# Patient Record
Sex: FEMALE | Race: WHITE | NOT HISPANIC OR LATINO | Employment: OTHER | URBAN - METROPOLITAN AREA
[De-identification: names, ages, dates, MRNs, and addresses within clinical notes are randomized per-mention and may not be internally consistent; named-entity substitution may affect disease eponyms.]

---

## 2023-01-10 ENCOUNTER — OFFICE VISIT (OUTPATIENT)
Dept: FAMILY MEDICINE CLINIC | Facility: CLINIC | Age: 83
End: 2023-01-10

## 2023-01-10 VITALS
TEMPERATURE: 97.8 F | DIASTOLIC BLOOD PRESSURE: 86 MMHG | BODY MASS INDEX: 24.35 KG/M2 | HEART RATE: 83 BPM | WEIGHT: 129 LBS | SYSTOLIC BLOOD PRESSURE: 128 MMHG | OXYGEN SATURATION: 100 % | HEIGHT: 61 IN

## 2023-01-10 DIAGNOSIS — E78.5 HYPERLIPIDEMIA, UNSPECIFIED HYPERLIPIDEMIA TYPE: ICD-10-CM

## 2023-01-10 DIAGNOSIS — N18.31 STAGE 3A CHRONIC KIDNEY DISEASE (HCC): ICD-10-CM

## 2023-01-10 DIAGNOSIS — D61.818 PANCYTOPENIA (HCC): ICD-10-CM

## 2023-01-10 DIAGNOSIS — E03.9 ACQUIRED HYPOTHYROIDISM: ICD-10-CM

## 2023-01-10 DIAGNOSIS — C34.92 NON-SMALL CELL CARCINOMA OF LEFT LUNG (HCC): ICD-10-CM

## 2023-01-10 DIAGNOSIS — Z00.00 INITIAL MEDICARE ANNUAL WELLNESS VISIT: Primary | ICD-10-CM

## 2023-01-10 DIAGNOSIS — R09.81 CHRONIC NASAL CONGESTION: ICD-10-CM

## 2023-01-10 DIAGNOSIS — E61.1 IRON DEFICIENCY: ICD-10-CM

## 2023-01-10 DIAGNOSIS — I73.9 PERIPHERAL VASCULAR DISEASE (HCC): ICD-10-CM

## 2023-01-10 DIAGNOSIS — I48.0 PAROXYSMAL ATRIAL FIBRILLATION (HCC): ICD-10-CM

## 2023-01-10 DIAGNOSIS — J45.20 MILD INTERMITTENT ASTHMA, UNSPECIFIED WHETHER COMPLICATED: ICD-10-CM

## 2023-01-10 PROBLEM — J45.909 ASTHMA: Status: ACTIVE | Noted: 2017-05-28

## 2023-01-10 PROBLEM — G25.2 ACTION TREMOR: Status: ACTIVE | Noted: 2022-03-25

## 2023-01-10 RX ORDER — OLMESARTAN MEDOXOMIL 20 MG/1
1 TABLET ORAL AS NEEDED
COMMUNITY
Start: 2022-06-07 | End: 2023-07-17 | Stop reason: SDUPTHER

## 2023-01-10 RX ORDER — DOCUSATE SODIUM 100 MG/1
100 CAPSULE, LIQUID FILLED ORAL AS NEEDED
COMMUNITY

## 2023-01-10 RX ORDER — ASPIRIN 325 MG
325 TABLET, DELAYED RELEASE (ENTERIC COATED) ORAL
COMMUNITY

## 2023-01-10 RX ORDER — IPRATROPIUM BROMIDE 21 UG/1
2 SPRAY, METERED NASAL 2 TIMES DAILY
Qty: 30 ML | Refills: 5 | Status: SHIPPED | OUTPATIENT
Start: 2023-01-10

## 2023-01-10 RX ORDER — AMIODARONE HYDROCHLORIDE 100 MG/1
100 TABLET ORAL DAILY
COMMUNITY

## 2023-01-10 RX ORDER — LORATADINE 10 MG/1
10 CAPSULE, LIQUID FILLED ORAL
COMMUNITY

## 2023-01-10 RX ORDER — IPRATROPIUM BROMIDE 21 UG/1
2 SPRAY, METERED NASAL
COMMUNITY
Start: 2022-12-06 | End: 2023-01-10 | Stop reason: SDUPTHER

## 2023-01-10 RX ORDER — ROSUVASTATIN CALCIUM 10 MG/1
10 TABLET, COATED ORAL EVERY EVENING
COMMUNITY

## 2023-01-10 RX ORDER — IPRATROPIUM BROMIDE 21 UG/1
2 SPRAY, METERED NASAL
Status: CANCELLED | OUTPATIENT
Start: 2023-01-10

## 2023-01-10 RX ORDER — SIMETHICONE 125 MG
125 TABLET,CHEWABLE ORAL EVERY 6 HOURS PRN
COMMUNITY

## 2023-01-10 RX ORDER — FLUTICASONE PROPIONATE AND SALMETEROL 250; 50 UG/1; UG/1
1 POWDER RESPIRATORY (INHALATION)
COMMUNITY
End: 2023-01-10 | Stop reason: ALTCHOICE

## 2023-01-10 RX ORDER — LEVOTHYROXINE SODIUM 88 UG/1
TABLET ORAL
COMMUNITY
Start: 2022-12-19 | End: 2023-07-07 | Stop reason: SDUPTHER

## 2023-01-10 RX ORDER — ESOMEPRAZOLE MAGNESIUM 40 MG/1
40 CAPSULE, DELAYED RELEASE ORAL DAILY
COMMUNITY

## 2023-01-10 NOTE — PATIENT INSTRUCTIONS
Complete labs in 6 months  Follow up visit in July  Medicare Preventive Visit Patient Instructions  Thank you for completing your Welcome to Medicare Visit or Medicare Annual Wellness Visit today  Your next wellness visit will be due in one year (1/11/2024)  The screening/preventive services that you may require over the next 5-10 years are detailed below  Some tests may not apply to you based off risk factors and/or age  Screening tests ordered at today's visit but not completed yet may show as past due  Also, please note that scanned in results may not display below  Preventive Screenings:  Service Recommendations Previous Testing/Comments   Colorectal Cancer Screening  * Colonoscopy    * Fecal Occult Blood Test (FOBT)/Fecal Immunochemical Test (FIT)  * Fecal DNA/Cologuard Test  * Flexible Sigmoidoscopy Age: 39-70 years old   Colonoscopy: every 10 years (may be performed more frequently if at higher risk)  OR  FOBT/FIT: every 1 year  OR  Cologuard: every 3 years  OR  Sigmoidoscopy: every 5 years  Screening may be recommended earlier than age 39 if at higher risk for colorectal cancer  Also, an individualized decision between you and your healthcare provider will decide whether screening between the ages of 74-80 would be appropriate  Colonoscopy: Not on file  FOBT/FIT: Not on file  Cologuard: Not on file  Sigmoidoscopy: Not on file          Breast Cancer Screening Age: 36 years old  Frequency: every 1-2 years  Not required if history of left and right mastectomy Mammogram: Not on file        Cervical Cancer Screening Between the ages of 21-29, pap smear recommended once every 3 years  Between the ages of 33-67, can perform pap smear with HPV co-testing every 5 years     Recommendations may differ for women with a history of total hysterectomy, cervical cancer, or abnormal pap smears in past  Pap Smear: Not on file    Screening Not Indicated   Hepatitis C Screening Once for adults born between 1945 and 1965  More frequently in patients at high risk for Hepatitis C Hep C Antibody: Not on file        Diabetes Screening 1-2 times per year if you're at risk for diabetes or have pre-diabetes Fasting glucose: No results in last 5 years (No results in last 5 years)  A1C: No results in last 5 years (No results in last 5 years)      Cholesterol Screening Once every 5 years if you don't have a lipid disorder  May order more often based on risk factors  Lipid panel: Not on file          Other Preventive Screenings Covered by Medicare:  Abdominal Aortic Aneurysm (AAA) Screening: covered once if your at risk  You're considered to be at risk if you have a family history of AAA  Lung Cancer Screening: covers low dose CT scan once per year if you meet all of the following conditions: (1) Age 50-69; (2) No signs or symptoms of lung cancer; (3) Current smoker or have quit smoking within the last 15 years; (4) You have a tobacco smoking history of at least 20 pack years (packs per day multiplied by number of years you smoked); (5) You get a written order from a healthcare provider  Glaucoma Screening: covered annually if you're considered high risk: (1) You have diabetes OR (2) Family history of glaucoma OR (3)  aged 48 and older OR (3)  American aged 72 and older  Osteoporosis Screening: covered every 2 years if you meet one of the following conditions: (1) You're estrogen deficient and at risk for osteoporosis based off medical history and other findings; (2) Have a vertebral abnormality; (3) On glucocorticoid therapy for more than 3 months; (4) Have primary hyperparathyroidism; (5) On osteoporosis medications and need to assess response to drug therapy  Last bone density test (DXA Scan): Not on file  HIV Screening: covered annually if you're between the age of 12-76  Also covered annually if you are younger than 13 and older than 72 with risk factors for HIV infection   For pregnant patients, it is covered up to 3 times per pregnancy  Immunizations:  Immunization Recommendations   Influenza Vaccine Annual influenza vaccination during flu season is recommended for all persons aged >= 6 months who do not have contraindications   Pneumococcal Vaccine   * Pneumococcal conjugate vaccine = PCV13 (Prevnar 13), PCV15 (Vaxneuvance), PCV20 (Prevnar 20)  * Pneumococcal polysaccharide vaccine = PPSV23 (Pneumovax) Adults 25-60 years old: 1-3 doses may be recommended based on certain risk factors  Adults 72 years old: 1-2 doses may be recommended based off what pneumonia vaccine you previously received   Hepatitis B Vaccine 3 dose series if at intermediate or high risk (ex: diabetes, end stage renal disease, liver disease)   Tetanus (Td) Vaccine - COST NOT COVERED BY MEDICARE PART B Following completion of primary series, a booster dose should be given every 10 years to maintain immunity against tetanus  Td may also be given as tetanus wound prophylaxis  Tdap Vaccine - COST NOT COVERED BY MEDICARE PART B Recommended at least once for all adults  For pregnant patients, recommended with each pregnancy  Shingles Vaccine (Shingrix) - COST NOT COVERED BY MEDICARE PART B  2 shot series recommended in those aged 48 and above     Health Maintenance Due:  There are no preventive care reminders to display for this patient  Immunizations Due:      Topic Date Due    Hepatitis B Vaccine (1 of 3 - 3-dose series) Never done    COVID-19 Vaccine (5 - Booster for Moderna series) 08/02/2022     Advance Directives   What are advance directives? Advance directives are legal documents that state your wishes and plans for medical care  These plans are made ahead of time in case you lose your ability to make decisions for yourself  Advance directives can apply to any medical decision, such as the treatments you want, and if you want to donate organs  What are the types of advance directives?   There are many types of advance directives, and each state has rules about how to use them  You may choose a combination of any of the following:  Living will: This is a written record of the treatment you want  You can also choose which treatments you do not want, which to limit, and which to stop at a certain time  This includes surgery, medicine, IV fluid, and tube feedings  Durable power of  for healthcare [facility]): This is a written record that states who you want to make healthcare choices for you when you are unable to make them for yourself  This person, called a proxy, is usually a family member or a friend  You may choose more than 1 proxy  Do not resuscitate (DNR) order:  A DNR order is used in case your heart stops beating or you stop breathing  It is a request not to have certain forms of treatment, such as CPR  A DNR order may be included in other types of advance directives  Medical directive: This covers the care that you want if you are in a coma, near death, or unable to make decisions for yourself  You can list the treatments you want for each condition  Treatment may include pain medicine, surgery, blood transfusions, dialysis, IV or tube feedings, and a ventilator (breathing machine)  Values history: This document has questions about your views, beliefs, and how you feel and think about life  This information can help others choose the care that you would choose  Why are advance directives important? An advance directive helps you control your care  Although spoken wishes may be used, it is better to have your wishes written down  Spoken wishes can be misunderstood, or not followed  Treatments may be given even if you do not want them  An advance directive may make it easier for your family to make difficult choices about your care  © Copyright [employer] 2018 Information is for End User's use only and may not be sold, redistributed or otherwise used for commercial purposes   All illustrations and images included in [facility] are the copyrighted property of A D A M , Inc  or [facility] Marianne Chou

## 2023-01-10 NOTE — ASSESSMENT & PLAN NOTE
A fib is currently rate controlled with medications  Continue Amiodarone 100 mg daily  Continue Eliquis and aspirin daily  Patient is refusing referral to Cardiology at this time

## 2023-01-10 NOTE — ASSESSMENT & PLAN NOTE
Continue management per oncologist Dr Tracey Thomason in [place]  Patient is in remission and not undergoing active treatment at this time  She is scheduled for Brain MRI and CT PET scan later this month

## 2023-01-10 NOTE — PROGRESS NOTES
Assessment and Plan:     Problem List Items Addressed This Visit        Endocrine    Acquired hypothyroidism     Check TSH/T4 level every 6 months  Continue Levothyroxine 88 mcg daily  Relevant Orders    TSH, 3rd generation with Free T4 reflex       Respiratory    Asthma     Asthma is well controlled with Combivent inhaler  Non-small cell carcinoma of left lung [facility])     Continue management per oncologist Dr Janna Brewster in [place]  Patient is in remission and not undergoing active treatment at this time  She is scheduled for Brain MRI and CT PET scan later this month  Relevant Medications    ipratropium (ATROVENT) 0 03 % nasal spray       Cardiovascular and Mediastinum    Peripheral vascular disease (HCC)     Continue Eliquis and aspirin daily  Patient is refusing referral to cardiology at this time  Paroxysmal atrial fibrillation (HCC)     A fib is currently rate controlled with medications  Continue Amiodarone 100 mg daily  Continue Eliquis and aspirin daily  Patient is refusing referral to Cardiology at this time  Hematopoietic and Hemostatic    Pancytopenia (HCC)     Chronic         Relevant Medications    apixaban (ELIQUIS) 2 5 mg       Genitourinary    Stage 3a chronic kidney disease (HCC)     Chronic, monitor every 6 months          Relevant Orders    CBC and differential    Comprehensive metabolic panel       Other    Hyperlipidemia     Check fasting lipid panel and continue Crestor 10 mg daily  Relevant Medications    rosuvastatin (CRESTOR) 10 MG tablet    Other Relevant Orders    Lipid Panel with Direct LDL reflex    Iron deficiency     History of iron deficiency  Monitor regularly  Relevant Orders    Iron Panel (Includes Ferritin, Iron Sat%, Iron, and TIBC)    Chronic nasal congestion     Chronic nasal congestion controlled with nasal spray daily            Relevant Medications    ipratropium (ATROVENT) 0 03 % nasal spray Other Visit Diagnoses     Initial Medicare annual wellness visit    -  Primary           Preventive health issues were discussed with patient, and age appropriate screening tests were ordered as noted in patient's After Visit Summary  Personalized health advice and appropriate referrals for health education or preventive services given if needed, as noted in patient's After Visit Summary  Patient is here to establish care  She recently moved to the area from [place] and is living her son Marta Davalos and his wife Lilibeth Maguire  She has some gait imbalance and uses her walker for stablity and safety  Patient admits that she does not exercise regularly  She does not eat dinner  She only eats breakfast and lunch  She has no acute complaints today  Patient is going to follow with her oncologist Dr Amado Gonzalez in [place]  History of Present Illness:     Patient presents for a Medicare Wellness Visit    HPI   Patient Care Team:  Jacob Singh DO as PCP - General (Family Medicine)     Review of Systems:     Review of Systems   Constitutional: Negative for activity change, appetite change, chills, fatigue and fever  HENT: Negative for congestion, ear discharge, ear pain, postnasal drip, rhinorrhea, sinus pressure, sinus pain, sneezing, sore throat and trouble swallowing  Eyes: Negative for pain, discharge, redness, itching and visual disturbance  Respiratory: Negative for apnea, cough, chest tightness, shortness of breath and wheezing  Cardiovascular: Negative for chest pain, palpitations and leg swelling  Gastrointestinal: Negative for abdominal distention, abdominal pain, blood in stool, constipation, diarrhea, nausea and vomiting  Endocrine: Negative for polyuria  Genitourinary: Negative for decreased urine volume, difficulty urinating, dyspareunia, dysuria, flank pain, frequency, menstrual problem, pelvic pain, urgency, vaginal bleeding and vaginal discharge     Musculoskeletal: Negative for arthralgias, gait problem, joint swelling and myalgias  Skin: Negative for rash  Neurological: Negative for dizziness, weakness, light-headedness, numbness and headaches  Psychiatric/Behavioral: Negative for behavioral problems and dysphoric mood  The patient is not nervous/anxious  All other systems reviewed and are negative  Problem List:     Patient Active Problem List   Diagnosis   • Acquired hypothyroidism   • Action tremor   • Asthma   • Stage 3[facility]   • Hyperlipidemia   • Non-small cell carcinoma of left lung (HCC)   • Pancytopenia (HCC)   • Peripheral vascular disease (HCC)   • Paroxysmal atrial fibrillation (HCC)   • Iron deficiency   • Chronic nasal congestion      Past Medical and Surgical History:     Past Medical History:   Diagnosis Date   • [facility]ib [facility] )    • Chronic kidney disease    • Essential hypertension    • Hyperlipidemia    • Osteoporosis    • Thyroid disease      Past Surgical History:   Procedure Laterality Date   • CAROTID ENDARTARECTOMY Left    • HIP SURGERY Bilateral    • LUNG SURGERY Left       Family History:     Family History   Problem Relation Age of Onset   • Heart disease Mother    • Heart disease Father    • Kidney disease Father       Social History:     Social History     Socioeconomic History   • Marital status:       Spouse name: None   • Number of children: None   • Years of education: None   • Highest education level: None   Occupational History   • None   Tobacco Use   • Smoking status: Former     Types: Cigarettes   • Smokeless tobacco: Never   Vaping Use   • Vaping Use: Never used   Substance and Sexual Activity   • Alcohol use: Yes     Comment: rarely   • Drug use: None   • Sexual activity: None   Other Topics Concern   • None   Social History Narrative   • None     Social Determinants of Health     Financial Resource Strain: Not on file   Food Insecurity: Not on file   Transportation Needs: Not on file   Physical Activity: Not on file   Stress: Not on file   Social Connections: Not on file   Intimate Partner Violence: Not on file   Housing Stability: Not on file      Medications and Allergies:     Current Outpatient Medications   Medication Sig Dispense Refill   • amiodarone 100 mg tablet Take 100 mg by mouth daily     • apixaban (ELIQUIS) 2 5 mg Take by mouth 2 (two) times a day     • aspirin (ECOTRIN) 325 mg EC tablet Take 325 mg by mouth     • Calcium Carbonate (CALTRATE 600 PO) Take by mouth     • dextromethorphan-guaifenesin (MUCINEX DM)  MG per 12 hr tablet Take 1 tablet by mouth if needed     • docusate sodium (COLACE) 100 mg capsule Take 100 mg by mouth if needed for constipation     • esomeprazole (NexIUM) 40 MG capsule Take 40 mg by mouth in the morning     • ipratropium (ATROVENT) 0 03 % nasal spray 2 sprays into each nostril 2 (two) times a day 30 mL 5   • ipratropium-albuterol (COMBIVENT RESPIMAT) inhaler Inhale 1 puff every 6 (six) hours as needed     • levothyroxine 88 mcg tablet Take by mouth 1/2 tablet Saturday and one tab all other days  Take on empty stomach, at least 30 minutes before food        • Loratadine 10 MG CAPS Take 10 mg by mouth     • olmesartan (BENICAR) 20 mg tablet Take 1 tablet by mouth if needed     • rosuvastatin (CRESTOR) 10 MG tablet Take 10 mg by mouth every evening     • simethicone (MYLICON) 236 MG chewable tablet Chew 125 mg every 6 (six) hours as needed       No current facility-administered medications for this visit       Allergies   Allergen Reactions   • Other Eye Swelling     Seasonal Allergies        Immunizations:     Immunization History   Administered Date(s) Administered   • COVID-19 MODERNA VACC 0 5 ML IM 02/09/2021, 03/09/2021, 10/26/2021, 06/07/2022   • INFLUENZA 09/13/2021   • Influenza Split High Dose Preservative Free IM 09/14/2017, 10/08/2020, 09/11/2022   • Influenza, high dose seasonal 0 7 mL 10/04/2018, 10/18/2019   • Influenza, seasonal, injectable 09/08/2010, 10/01/2011, 09/27/2012, 09/14/2013, 09/18/2014, 09/30/2014, 10/05/2016   • Pneumococcal Conjugate 13-Valent 09/29/2009, 11/12/2014, 02/02/2015   • Pneumococcal Polysaccharide PPV23 09/09/2009, 11/12/2014   • Zoster 06/01/2015   • Zoster Vaccine Recombinant 09/10/2019, 12/10/2019      Health Maintenance: There are no preventive care reminders to display for this patient  Topic Date Due   • Hepatitis B Vaccine (1 of 3 - 3-dose series) Never done   • COVID-19 Vaccine (5 - Booster for Moderna series) 08/02/2022      Medicare Screening Tests and Risk Assessments:     Charity Pollard is here for her Initial Wellness visit  Health Risk Assessment:   Patient rates overall health as very good  Patient feels that their physical health rating is much better  Patient is very satisfied with their life  Eyesight was rated as slightly worse  Hearing was rated as same  Patient feels that their emotional and mental health rating is much better  Patients states they are never, rarely angry  Patient states they are never, rarely unusually tired/fatigued  Pain experienced in the last 7 days has been some  Patient's pain rating has been 1/10  Patient states that she has experienced no weight loss or gain in last 6 months  Fall Risk Screening: In the past year, patient has experienced: no history of falling in past year      Urinary Incontinence Screening:   Patient has not leaked urine accidently in the last six months  Home Safety:  Patient has trouble with stairs inside or outside of their home  Patient has working smoke alarms and has working carbon monoxide detector  Home safety hazards include: none  Nutrition:   Current diet is Regular  Medications:   Patient is currently taking over-the-counter supplements  OTC medications include: see medication list  Patient is able to manage medications  Advance Care Planning:   Living will: No    Durable POA for healthcare:  Yes    Advanced directive: Yes    Five wishes given: Yes      PREVENTIVE SCREENINGS        Cervical Cancer Screening:    General: Screening Not Indicated      Lung Cancer Screening:     General: Screening Not Indicated    Screening, Brief Intervention, and Referral to Treatment (SBIRT)    Screening  Typical number of drinks in a day: 1  Typical number of drinks in a week: 7  Interpretation: Low risk drinking behavior  Single Item Drug Screening:  How often have you used an illegal drug (including marijuana) or a prescription medication for non-medical reasons in the past year? never    Single Item Drug Screen Score: 0  Interpretation: Negative screen for possible drug use disorder    No results found  Physical Exam:     /86 (BP Location: Right arm, Patient Position: Sitting, Cuff Size: Standard)   Pulse 83   Temp 97 8 °F (36 6 °C) (Temporal)   Ht 5' 1" (1 549 m)   Wt 58 5 kg (129 lb)   SpO2 100%   BMI 24 37 kg/m²     Physical Exam  Vitals and nursing note reviewed  Constitutional:       General: She is not in acute distress  Appearance: Normal appearance  HENT:      Head: Normocephalic and atraumatic  Right Ear: Tympanic membrane, ear canal and external ear normal       Left Ear: Tympanic membrane, ear canal and external ear normal       Nose: Nose normal  No congestion  Mouth/Throat:      Mouth: Mucous membranes are moist       Pharynx: Oropharynx is clear  Eyes:      Extraocular Movements: Extraocular movements intact  Conjunctiva/sclera: Conjunctivae normal       Pupils: Pupils are equal, round, and reactive to light  Neck:      Vascular: No carotid bruit  Cardiovascular:      Rate and Rhythm: Normal rate and regular rhythm  Heart sounds: Murmur heard  Pulmonary:      Effort: Pulmonary effort is normal  No respiratory distress  Breath sounds: Normal breath sounds  Abdominal:      General: Bowel sounds are normal  There is no distension  Palpations: Abdomen is soft  There is no mass  Musculoskeletal:         General: No swelling  Normal range of motion  Cervical back: Normal range of motion  Lymphadenopathy:      Cervical: No cervical adenopathy  Skin:     General: Skin is warm and dry  Neurological:      General: No focal deficit present  Mental Status: She is alert and oriented to person, place, and time  Psychiatric:         Mood and Affect: Mood normal          Behavior: Behavior normal          Thought Content:  Thought content normal           Monique Brown DO

## 2023-01-11 ENCOUNTER — TELEPHONE (OUTPATIENT)
Dept: ADMINISTRATIVE | Facility: OTHER | Age: 83
End: 2023-01-11

## 2023-01-11 NOTE — TELEPHONE ENCOUNTER
Upon review of the In Basket request and the patient's chart, initial outreach has been made via fax to facility  Please see Contacts section for details       Thank you  Rick Martines MA

## 2023-01-11 NOTE — LETTER
Procedure Request Form: DEXA Scan      Date Requested: 23  Patient: Saul Newberry  Patient : [id number]   Referring Provider: Lukas Golden DO        Date of Procedure ______________________________       The above patient has informed us that they have completed their   most recent DEXA Scan at your facility  Please complete   this form and attach all corresponding procedure reports/results  Comments ___2021 Dexa result - This result has an attachment that is not available  Would you release this to us?____________________________________  ____________________________________________________________________  ____________________________________________________________________  ____________________________________________________________________    Facility Completing Procedure _________________________________________    Form Completed By (print name) _______________________________________      Signature __________________________________________________________      These reports are needed for  compliance  Please fax this completed form and a copy of the procedure report to our office located at [place] [address] as soon as possible to [phone] pari Youssef: Phone [phone]    We thank you for your assistance in treating our mutual patient

## 2023-01-11 NOTE — TELEPHONE ENCOUNTER
----- Message from Berhane Wooten, [address] [place] sent at 1/10/2023 12:34 PM EST -----  Regarding: Care Gap Request  01/10/23 12:34 PM    Hello, our patient attached above has had DEXA Scan completed/performed  Please assist in updating the patient chart by pulling the Care Everywhere (CE) document  The date of service is 2021       Thank you,  Randa Mason PG [facility]

## 2023-01-18 NOTE — TELEPHONE ENCOUNTER
Upon review of the In Basket request we were able to locate, review, and update the patient chart as requested for DEXA Scan  Any additional questions or concerns should be emailed to the Practice Liaisons via the appropriate education email address, please do not reply via In Basket      Thank you  Rosario Marie MA

## 2023-01-18 NOTE — TELEPHONE ENCOUNTER
As a follow-up, a second attempt has been made for outreach via telephone call to facility  Please see Contacts section for details      Thank you  Felipe Anderson MA

## 2023-02-13 ENCOUNTER — TELEPHONE (OUTPATIENT)
Dept: FAMILY MEDICINE CLINIC | Facility: CLINIC | Age: 83
End: 2023-02-13

## 2023-02-13 NOTE — TELEPHONE ENCOUNTER
Patient called requesting her nasal spray Atrovent, to reflect an "every 8 hour" dosage  She states this medication was initially prescribed by another doctor and the instructions were for every 8 hours  However she notices the instructions have changed to 2 times a day  She will need to use the spray more than twice a day and is requesting the change in instructions because she will run out sooner   Thank you

## 2023-02-15 DIAGNOSIS — R09.81 CHRONIC NASAL CONGESTION: ICD-10-CM

## 2023-02-15 RX ORDER — IPRATROPIUM BROMIDE 21 UG/1
2 SPRAY, METERED NASAL 3 TIMES DAILY
Qty: 30 ML | Refills: 5 | Status: SHIPPED | OUTPATIENT
Start: 2023-02-15

## 2023-02-15 NOTE — TELEPHONE ENCOUNTER
Please notify patient that her medication was changed to correct dosage of three times daily  I sent a refill to her [facility] as well

## 2023-02-17 NOTE — TELEPHONE ENCOUNTER
Lvm notifying patient that her request to update a prescription was completed by the doctor and submitted to [facility]

## 2023-03-14 ENCOUNTER — OFFICE VISIT (OUTPATIENT)
Dept: FAMILY MEDICINE CLINIC | Facility: CLINIC | Age: 83
End: 2023-03-14

## 2023-03-14 VITALS
HEIGHT: 61 IN | DIASTOLIC BLOOD PRESSURE: 62 MMHG | TEMPERATURE: 97.8 F | BODY MASS INDEX: 24.35 KG/M2 | WEIGHT: 129 LBS | HEART RATE: 102 BPM | SYSTOLIC BLOOD PRESSURE: 100 MMHG | OXYGEN SATURATION: 96 %

## 2023-03-14 DIAGNOSIS — J01.90 ACUTE SINUSITIS, RECURRENCE NOT SPECIFIED, UNSPECIFIED LOCATION: Primary | ICD-10-CM

## 2023-03-14 RX ORDER — AMOXICILLIN AND CLAVULANATE POTASSIUM 875; 125 MG/1; MG/1
1 TABLET, FILM COATED ORAL EVERY 12 HOURS SCHEDULED
Qty: 14 TABLET | Refills: 0 | Status: SHIPPED | OUTPATIENT
Start: 2023-03-14 | End: 2023-03-21

## 2023-03-14 NOTE — PROGRESS NOTES
Assessment/Plan:   1  Acute sinusitis, recurrence not specified, unspecified location  Symptoms appear secondary to an acute sinusitis  Start supportive care  Maintain hydration  Take with counter Mucinex  Start treatment with Augmentin  Follow-up if any symptoms persist   - amoxicillin-clavulanate (AUGMENTIN) 875-125 mg per tablet; Take 1 tablet by mouth every 12 (twelve) hours for 7 days  Dispense: 14 tablet; Refill: 0           There are no diagnoses linked to this encounter  Subjective:       Chief Complaint   Patient presents with   • Cold Like Symptoms      Patient ID: Patt Menendez is a 78 y o  female  Sinus Problem  This is a new problem  Episode onset: 1 month ago  The problem is unchanged  There has been no fever  Associated symptoms include congestion, headaches and sinus pressure  Pertinent negatives include no chills, coughing, ear pain, shortness of breath or sore throat  Treatments tried: Atropine nasal spray  The treatment provided no relief  Review of Systems   Constitutional: Negative for activity change, chills, fatigue and fever  HENT: Positive for congestion and sinus pressure  Negative for ear pain and sore throat  Eyes: Negative for redness, itching and visual disturbance  Respiratory: Negative for cough and shortness of breath  Cardiovascular: Negative for chest pain and palpitations  Gastrointestinal: Negative for abdominal pain, diarrhea and nausea  Endocrine: Negative for cold intolerance and heat intolerance  Genitourinary: Negative for dysuria, flank pain and frequency  Musculoskeletal: Negative for arthralgias, back pain, gait problem and myalgias  Skin: Negative for color change  Allergic/Immunologic: Negative for environmental allergies  Neurological: Positive for headaches  Negative for dizziness and numbness  Psychiatric/Behavioral: Negative for behavioral problems and sleep disturbance         The following portions of the patient's history were reviewed and updated as appropriate : past family history, past medical history, past social history and past surgical history  Current Outpatient Medications:   •  amiodarone 100 mg tablet, Take 100 mg by mouth daily, Disp: , Rfl:   •  apixaban (ELIQUIS) 2 5 mg, Take by mouth 2 (two) times a day, Disp: , Rfl:   •  aspirin (ECOTRIN) 325 mg EC tablet, Take 325 mg by mouth, Disp: , Rfl:   •  Calcium Carbonate (CALTRATE 600 PO), Take by mouth, Disp: , Rfl:   •  dextromethorphan-guaifenesin (MUCINEX DM)  MG per 12 hr tablet, Take 1 tablet by mouth if needed, Disp: , Rfl:   •  docusate sodium (COLACE) 100 mg capsule, Take 100 mg by mouth if needed for constipation, Disp: , Rfl:   •  esomeprazole (NexIUM) 40 MG capsule, Take 40 mg by mouth in the morning, Disp: , Rfl:   •  ipratropium (ATROVENT) 0 03 % nasal spray, 2 sprays into each nostril 3 (three) times a day, Disp: 30 mL, Rfl: 5  •  levothyroxine 88 mcg tablet, Take by mouth 1/2 tablet Saturday and one tab all other days  Take on empty stomach, at least 30 minutes before food   , Disp: , Rfl:   •  Loratadine 10 MG CAPS, Take 10 mg by mouth, Disp: , Rfl:   •  olmesartan (BENICAR) 20 mg tablet, Take 1 tablet by mouth if needed, Disp: , Rfl:   •  rosuvastatin (CRESTOR) 10 MG tablet, Take 10 mg by mouth every evening, Disp: , Rfl:   •  simethicone (MYLICON) 939 MG chewable tablet, Chew 125 mg every 6 (six) hours as needed, Disp: , Rfl:   •  ipratropium-albuterol (COMBIVENT RESPIMAT) inhaler, Inhale 1 puff every 6 (six) hours as needed, Disp: , Rfl:          Objective:         Vitals:    03/14/23 1402   BP: 100/62   BP Location: Left arm   Patient Position: Sitting   Cuff Size: Large   Pulse: 102   Temp: 97 8 °F (36 6 °C)   TempSrc: Tympanic   SpO2: 96%   Weight: 58 5 kg (129 lb)   Height: 5' 1" (1 549 m)     Physical Exam  Vitals reviewed  Constitutional:       Appearance: She is well-developed  HENT:      Head: Normocephalic and atraumatic        Nose: Nose normal       Mouth/Throat:      Pharynx: No oropharyngeal exudate  Eyes:      General: No scleral icterus  Right eye: No discharge  Left eye: No discharge  Pupils: Pupils are equal, round, and reactive to light  Neck:      Trachea: No tracheal deviation  Cardiovascular:      Rate and Rhythm: Normal rate and regular rhythm  Pulses:           Dorsalis pedis pulses are 2+ on the right side and 2+ on the left side  Posterior tibial pulses are 2+ on the right side and 2+ on the left side  Heart sounds: Normal heart sounds  No murmur heard  No friction rub  No gallop  Pulmonary:      Effort: Pulmonary effort is normal  No respiratory distress  Breath sounds: Normal breath sounds  No wheezing or rales  Abdominal:      General: Bowel sounds are normal  There is no distension  Palpations: Abdomen is soft  Tenderness: There is no abdominal tenderness  There is no guarding or rebound  Musculoskeletal:         General: Normal range of motion  Cervical back: Normal range of motion and neck supple  Lymphadenopathy:      Head:      Right side of head: No submental or submandibular adenopathy  Left side of head: No submental or submandibular adenopathy  Cervical: No cervical adenopathy  Right cervical: No superficial, deep or posterior cervical adenopathy  Left cervical: No superficial, deep or posterior cervical adenopathy  Skin:     General: Skin is warm and dry  Findings: No erythema  Neurological:      Mental Status: She is alert and oriented to person, place, and time  Cranial Nerves: No cranial nerve deficit  Sensory: No sensory deficit  Psychiatric:         Mood and Affect: Mood is not anxious or depressed  Speech: Speech normal          Behavior: Behavior normal          Thought Content:  Thought content normal          Judgment: Judgment normal

## 2023-06-07 DIAGNOSIS — K21.9 GASTROESOPHAGEAL REFLUX DISEASE WITHOUT ESOPHAGITIS: Primary | ICD-10-CM

## 2023-06-07 RX ORDER — ESOMEPRAZOLE MAGNESIUM 40 MG/1
40 CAPSULE, DELAYED RELEASE ORAL DAILY
Qty: 30 CAPSULE | Refills: 0 | Status: SHIPPED | OUTPATIENT
Start: 2023-06-07

## 2023-06-07 NOTE — TELEPHONE ENCOUNTER
"\"My name my name is Neda Leyden birth date [birth year]  I'm a patient of doctor Jacob Wilkerson and I need a Nexium reordered and I use it's the Nexium once daily and I use [facility] and their phone number is [phone]  My phone phone number is area code [phone]  Excuse me start again, my phone number is area code [phone]  Thank you   Bye aarone \"  "

## 2023-07-07 DIAGNOSIS — E03.9 ACQUIRED HYPOTHYROIDISM: Primary | ICD-10-CM

## 2023-07-07 RX ORDER — LEVOTHYROXINE SODIUM 88 UG/1
TABLET ORAL
Status: CANCELLED | OUTPATIENT
Start: 2023-07-07

## 2023-07-07 RX ORDER — LEVOTHYROXINE SODIUM 88 UG/1
88 TABLET ORAL DAILY
Qty: 30 TABLET | Refills: 5 | Status: SHIPPED | OUTPATIENT
Start: 2023-07-07

## 2023-07-17 ENCOUNTER — TELEPHONE (OUTPATIENT)
Dept: FAMILY MEDICINE CLINIC | Facility: CLINIC | Age: 83
End: 2023-07-17

## 2023-07-17 DIAGNOSIS — I48.0 PAROXYSMAL ATRIAL FIBRILLATION (HCC): Primary | ICD-10-CM

## 2023-07-17 DIAGNOSIS — I10 PRIMARY HYPERTENSION: ICD-10-CM

## 2023-07-17 DIAGNOSIS — K21.9 GASTROESOPHAGEAL REFLUX DISEASE WITHOUT ESOPHAGITIS: ICD-10-CM

## 2023-07-17 RX ORDER — OLMESARTAN MEDOXOMIL 20 MG/1
20 TABLET ORAL DAILY
Qty: 180 TABLET | Refills: 0 | Status: SHIPPED | OUTPATIENT
Start: 2023-07-17 | End: 2024-01-13

## 2023-07-17 RX ORDER — OLMESARTAN MEDOXOMIL 20 MG/1
20 TABLET ORAL DAILY
Qty: 180 TABLET | Refills: 0 | Status: SHIPPED | OUTPATIENT
Start: 2023-07-17 | End: 2023-07-17 | Stop reason: SDUPTHER

## 2023-07-17 RX ORDER — ESOMEPRAZOLE MAGNESIUM 40 MG/1
40 CAPSULE, DELAYED RELEASE ORAL DAILY
Qty: 30 CAPSULE | Refills: 0 | Status: SHIPPED | OUTPATIENT
Start: 2023-07-17

## 2023-07-17 NOTE — TELEPHONE ENCOUNTER
Patient is requesting refill of Olmesartan, but states she takes 40 mg not 20 mg (she confirmed on her bottle) and takes one per day. Please send to [facility] in [facility]. Office note from Dr. Jewel Diaz 12/6/22 states 20 mg. Also wants to make you aware she will be starting radiation treatment for lung nodule.

## 2023-07-17 NOTE — TELEPHONE ENCOUNTER
Spoke with the Daniel in [facility] and they confirmed that pt last filled her script for Olmesartan back in March and it was for 20 mg and it was only a 30 day supply, so she wasn't sure if pt got a script filled elsewhere.

## 2023-08-14 DIAGNOSIS — K21.9 GASTROESOPHAGEAL REFLUX DISEASE WITHOUT ESOPHAGITIS: ICD-10-CM

## 2023-08-14 RX ORDER — ESOMEPRAZOLE MAGNESIUM 40 MG/1
40 CAPSULE, DELAYED RELEASE ORAL DAILY
Qty: 30 CAPSULE | Refills: 0 | Status: SHIPPED | OUTPATIENT
Start: 2023-08-14

## 2023-09-05 DIAGNOSIS — I48.0 PAROXYSMAL ATRIAL FIBRILLATION (HCC): Primary | ICD-10-CM

## 2023-09-05 RX ORDER — AMIODARONE HYDROCHLORIDE 100 MG/1
100 TABLET ORAL DAILY
Qty: 14 TABLET | Refills: 0 | Status: SHIPPED | OUTPATIENT
Start: 2023-09-05 | End: 2023-09-06 | Stop reason: SDUPTHER

## 2023-09-05 NOTE — TELEPHONE ENCOUNTER
Patient requesting refill of Amiodarone 100 mg. We have not refilled this for her previously. She said she got it from a doctor in [place] that she is no longer seeing Jessica Stoner). She took her last pill today. Please advise and send to [facility] in [facility] if appropriate.

## 2023-09-06 ENCOUNTER — OFFICE VISIT (OUTPATIENT)
Dept: FAMILY MEDICINE CLINIC | Facility: CLINIC | Age: 83
End: 2023-09-06
Payer: MEDICARE

## 2023-09-06 VITALS
DIASTOLIC BLOOD PRESSURE: 70 MMHG | OXYGEN SATURATION: 99 % | HEIGHT: 61 IN | BODY MASS INDEX: 24.84 KG/M2 | SYSTOLIC BLOOD PRESSURE: 124 MMHG | WEIGHT: 131.6 LBS | TEMPERATURE: 97.8 F | HEART RATE: 71 BPM

## 2023-09-06 DIAGNOSIS — K21.9 GASTROESOPHAGEAL REFLUX DISEASE WITHOUT ESOPHAGITIS: ICD-10-CM

## 2023-09-06 DIAGNOSIS — J44.9 COPD MIXED TYPE (HCC): ICD-10-CM

## 2023-09-06 DIAGNOSIS — I48.0 PAROXYSMAL ATRIAL FIBRILLATION (HCC): Primary | ICD-10-CM

## 2023-09-06 PROCEDURE — 99214 OFFICE O/P EST MOD 30 MIN: CPT | Performed by: FAMILY MEDICINE

## 2023-09-06 RX ORDER — AMIODARONE HYDROCHLORIDE 100 MG/1
100 TABLET ORAL DAILY
Qty: 90 TABLET | Refills: 0 | Status: SHIPPED | OUTPATIENT
Start: 2023-09-06 | End: 2023-12-05

## 2023-09-06 RX ORDER — ESOMEPRAZOLE MAGNESIUM 40 MG/1
40 CAPSULE, DELAYED RELEASE ORAL DAILY
Qty: 90 CAPSULE | Refills: 1 | Status: SHIPPED | OUTPATIENT
Start: 2023-09-06

## 2023-09-06 NOTE — TELEPHONE ENCOUNTER
Called pt. Pt stated that she does not have a cardiologist. Pt had a cardiologist in the past, but stated that the cardiologist told her that she didn't need to come back. Pt advised that if she was seeing a cardiologist that she is established and to reach out to them. She stated that they are in [place]. Pt stated that she would be unable to get in with a cardiologist in 2 weeks. Pt requested an appt with Dr. Joy Carlson to discuss. Pt scheduled.

## 2023-09-07 NOTE — PROGRESS NOTES
Assessment/Plan:   1. Paroxysmal atrial fibrillation [facility])  Patient appears clinically and hemodynamically stable today. She denies chest pain or palpitations. Reviewed her previous cardiology evaluation/note from July 2022. It appears that she did have adjustments in her treatment. It was also advised at that time from her cardiologist that she would need to be seen in 6 months for a follow-up however she did not schedule this. At this time, we will continue with her current treatment of amiodarone as well as her Eliquis. These refills were given to her today. She was specifically advised as well that she must see a cardiologist regularly due to this condition. Referral was placed today. We will help her schedule this appointment. - amiodarone 100 mg tablet; Take 1 tablet (100 mg total) by mouth daily  Dispense: 90 tablet; Refill: 0  - apixaban (ELIQUIS) 2.5 mg; Take 1 tablet (2.5 mg total) by mouth 2 (two) times a day  Dispense: 180 tablet; Refill: 0  - Ambulatory Referral to Cardiology; Future    2. Gastroesophageal reflux disease without esophagitis  Stable. At this time, continue with dietary trigger avoidance as well as her current treatment with Nexium.  - esomeprazole (NexIUM) 40 MG capsule; Take 1 capsule (40 mg total) by mouth in the morning  Dispense: 90 capsule; Refill: 1               Diagnoses and all orders for this visit:    Paroxysmal atrial fibrillation (HCC)  -     amiodarone 100 mg tablet; Take 1 tablet (100 mg total) by mouth daily  -     apixaban (ELIQUIS) 2.5 mg; Take 1 tablet (2.5 mg total) by mouth 2 (two) times a day  -     Ambulatory Referral to Cardiology; Future    Gastroesophageal reflux disease without esophagitis  -     esomeprazole (NexIUM) 40 MG capsule;  Take 1 capsule (40 mg total) by mouth in the morning    COPD mixed type (HCC)          Subjective:       Chief Complaint   Patient presents with   • Medication Management      Patient ID: Alfredo Leiva is a 78 y.o. female presents today to discuss her paroxysmal A-fib as well as her treatment. She states that she believes that she has been receiving her refills on her medications from her last PCP. She denies any chest pain or palpitations today. She denies vomiting lightheadedness or dizziness as well. She has been taking her Eliquis, amiodarone,  HPI    Review of Systems   Constitutional: Negative for activity change, chills, fatigue and fever. HENT: Negative for congestion, ear pain, sinus pressure and sore throat. Eyes: Negative for redness, itching and visual disturbance. Respiratory: Negative for cough and shortness of breath. Cardiovascular: Negative for chest pain and palpitations. Gastrointestinal: Negative for abdominal pain, diarrhea and nausea. Endocrine: Negative for cold intolerance and heat intolerance. Genitourinary: Negative for dysuria, flank pain and frequency. Musculoskeletal: Negative for arthralgias, back pain, gait problem and myalgias. Skin: Negative for color change. Allergic/Immunologic: Negative for environmental allergies. Neurological: Negative for dizziness, numbness and headaches. Psychiatric/Behavioral: Negative for behavioral problems and sleep disturbance. The following portions of the patient's history were reviewed and updated as appropriate : past family history, past medical history, past social history and past surgical history.     Current Outpatient Medications:   •  amiodarone 100 mg tablet, Take 1 tablet (100 mg total) by mouth daily, Disp: 90 tablet, Rfl: 0  •  apixaban (ELIQUIS) 2.5 mg, Take 1 tablet (2.5 mg total) by mouth 2 (two) times a day, Disp: 180 tablet, Rfl: 0  •  Calcium Carbonate (CALTRATE 600 PO), Take by mouth, Disp: , Rfl:   •  dextromethorphan-guaifenesin (MUCINEX DM)  MG per 12 hr tablet, Take 1 tablet by mouth if needed, Disp: , Rfl:   •  docusate sodium (COLACE) 100 mg capsule, Take 100 mg by mouth if needed for constipation, Disp: , Rfl:   •  esomeprazole (NexIUM) 40 MG capsule, Take 1 capsule (40 mg total) by mouth in the morning, Disp: 90 capsule, Rfl: 1  •  ipratropium (ATROVENT) 0.03 % nasal spray, 2 sprays into each nostril 3 (three) times a day, Disp: 30 mL, Rfl: 5  •  ipratropium-albuterol (COMBIVENT RESPIMAT) inhaler, Inhale 1 puff every 6 (six) hours as needed, Disp: , Rfl:   •  levothyroxine 88 mcg tablet, Take 1 tablet (88 mcg total) by mouth daily, Disp: 30 tablet, Rfl: 5  •  Loratadine 10 MG CAPS, Take 10 mg by mouth, Disp: , Rfl:   •  olmesartan (BENICAR) 20 mg tablet, Take 1 tablet (20 mg total) by mouth daily, Disp: 180 tablet, Rfl: 0  •  rosuvastatin (CRESTOR) 10 MG tablet, Take 10 mg by mouth every evening, Disp: , Rfl:   •  simethicone (MYLICON) 258 MG chewable tablet, Chew 125 mg every 6 (six) hours as needed, Disp: , Rfl:   •  aspirin (ECOTRIN) 325 mg EC tablet, Take 325 mg by mouth (Patient not taking: Reported on 9/6/2023), Disp: , Rfl:          Objective:         Vitals:    09/06/23 1039   BP: 124/70   BP Location: Left arm   Patient Position: Sitting   Cuff Size: Adult   Pulse: 71   Temp: 97.8 °F (36.6 °C)   SpO2: 99%   Weight: 59.7 kg (131 lb 9.6 oz)   Height: 5' 1" (1.549 m)     Physical Exam  Vitals reviewed. Constitutional:       Appearance: She is well-developed. HENT:      Head: Normocephalic and atraumatic. Nose: Nose normal.      Mouth/Throat:      Pharynx: No oropharyngeal exudate. Eyes:      General: No scleral icterus. Right eye: No discharge. Left eye: No discharge. Pupils: Pupils are equal, round, and reactive to light. Neck:      Trachea: No tracheal deviation. Cardiovascular:      Rate and Rhythm: Normal rate and regular rhythm. Pulses:           Dorsalis pedis pulses are 2+ on the right side and 2+ on the left side. Posterior tibial pulses are 2+ on the right side and 2+ on the left side. Heart sounds: Normal heart sounds. No murmur heard.      No friction rub. No gallop. Pulmonary:      Effort: Pulmonary effort is normal. No respiratory distress. Breath sounds: Normal breath sounds. No wheezing or rales. Abdominal:      General: Bowel sounds are normal. There is no distension. Palpations: Abdomen is soft. Tenderness: There is no abdominal tenderness. There is no guarding or rebound. Musculoskeletal:         General: Normal range of motion. Cervical back: Normal range of motion and neck supple. Lymphadenopathy:      Head:      Right side of head: No submental or submandibular adenopathy. Left side of head: No submental or submandibular adenopathy. Cervical: No cervical adenopathy. Right cervical: No superficial, deep or posterior cervical adenopathy. Left cervical: No superficial, deep or posterior cervical adenopathy. Skin:     General: Skin is warm and dry. Findings: No erythema. Neurological:      Mental Status: She is alert and oriented to person, place, and time. Cranial Nerves: No cranial nerve deficit. Sensory: No sensory deficit. Psychiatric:         Mood and Affect: Mood is not anxious or depressed. Speech: Speech normal.         Behavior: Behavior normal.         Thought Content:  Thought content normal.         Judgment: Judgment normal.

## 2023-11-06 ENCOUNTER — CONSULT (OUTPATIENT)
Dept: CARDIOLOGY CLINIC | Facility: CLINIC | Age: 83
End: 2023-11-06
Payer: MEDICARE

## 2023-11-06 VITALS
DIASTOLIC BLOOD PRESSURE: 80 MMHG | HEART RATE: 87 BPM | WEIGHT: 130.8 LBS | BODY MASS INDEX: 24.7 KG/M2 | SYSTOLIC BLOOD PRESSURE: 130 MMHG | HEIGHT: 61 IN

## 2023-11-06 DIAGNOSIS — I10 BENIGN ESSENTIAL HTN: ICD-10-CM

## 2023-11-06 DIAGNOSIS — E78.5 DYSLIPIDEMIA: ICD-10-CM

## 2023-11-06 DIAGNOSIS — J45.909 UNCOMPLICATED ASTHMA, UNSPECIFIED ASTHMA SEVERITY, UNSPECIFIED WHETHER PERSISTENT: ICD-10-CM

## 2023-11-06 DIAGNOSIS — I48.0 PAROXYSMAL ATRIAL FIBRILLATION (HCC): Primary | ICD-10-CM

## 2023-11-06 DIAGNOSIS — I77.9 BILATERAL CAROTID ARTERY DISEASE, UNSPECIFIED TYPE (HCC): ICD-10-CM

## 2023-11-06 PROCEDURE — 93000 ELECTROCARDIOGRAM COMPLETE: CPT | Performed by: INTERNAL MEDICINE

## 2023-11-06 PROCEDURE — 99204 OFFICE O/P NEW MOD 45 MIN: CPT | Performed by: INTERNAL MEDICINE

## 2023-11-06 NOTE — PROGRESS NOTES
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 Prior creatinine 1.5 6/2023. Regular rhythm/sinus rhythm on ECG, QTc interval normal at 445 ms. Patient not taking aspirin  Blood pressure controlled, continue olmesartan  Continue rosuvastatin for dyslipidemia. Will defer routine lipid panel to PCP with other health maintenance blood work. Patient currently refusing further surveillance of carotid artery disease      Follow-up in 6 months        Interval History: This is an 78-year-old female with paroxysmal atrial fibrillation and CVA who has followed Dr. Ezequiel Calle at [facility].  She has been placed on amiodarone for rhythm control and has been maintained on apixaban 2.5 mg twice daily. She was last seen by him 7/14/2022 at which time she was doing well. From a symptomatic standpoint she feels well without chest pain, shortness of breath, dizziness, palpitations, lower extremity edema. Vitals:  Vitals:    11/06/23 1020   BP: 130/80   Pulse: 87   Weight: 59.3 kg (130 lb 12.8 oz)   Height: 5' 1" (1.549 m)         Past Medical History:   Diagnosis Date   • Afib ([address])    • Chronic kidney disease    • Essential hypertension    • Hyperlipidemia    • Osteoporosis    • Thyroid disease      Social History     Socioeconomic History   • Marital status:       Spouse name: Not on file   • Number of children: Not on file   • Years of education: Not on file   • Highest education level: Not on file   Occupational History   • Not on file   Tobacco Use   • Smoking status: Former     Types: Cigarettes     Start date: 63     Quit date: 0     Years since quittin.8   • Smokeless tobacco: Never   Vaping Use   • Vaping Use: Never used   Substance and Sexual Activity   • Alcohol use: Yes     Comment: rarely   • Drug use: Not on file   • Sexual activity: Not on file   Other Topics Concern   • Not on file   Social History Narrative   • Not on file     Social Determinants of Health     Financial Resource Strain: Not on file   Food Insecurity: Not on file   Transportation Needs: Not on file   Physical Activity: Not on file   Stress: Not on file   Social Connections: Not on file   Intimate Partner Violence: Not on file   Housing Stability: Not on file      Family History   Problem Relation Age of Onset   • Heart disease Mother    • Heart disease Father    • Kidney disease Father      Past Surgical History:   Procedure Laterality Date   • CAROTID ENDARTARECTOMY Left    • CT NEEDLE BIOPSY LUNG  10/5/2020   • HIP SURGERY Bilateral    • LUNG SURGERY Left    • US GUIDED THYROID BIOPSY  10/28/2020       Current Outpatient Medications:   •  amiodarone 100 mg tablet, Take 1 tablet (100 mg total) by mouth daily, Disp: 90 tablet, Rfl: 0  •  apixaban (ELIQUIS) 2.5 mg, Take 1 tablet (2.5 mg total) by mouth 2 (two) times a day, Disp: 180 tablet, Rfl: 0  •  Calcium Carbonate (CALTRATE 600 PO), Take by mouth, Disp: , Rfl:   •  dextromethorphan-guaifenesin (MUCINEX DM)  MG per 12 hr tablet, Take 1 tablet by mouth if needed, Disp: , Rfl:   •  docusate sodium (COLACE) 100 mg capsule, Take 100 mg by mouth if needed for constipation, Disp: , Rfl:   •  esomeprazole (NexIUM) 40 MG capsule, Take 1 capsule (40 mg total) by mouth in the morning, Disp: 90 capsule, Rfl: 1  •  ipratropium-albuterol (COMBIVENT RESPIMAT) inhaler, Inhale 1 puff every 6 (six) hours as needed (wheezing), Disp: 4 g, Rfl: 5  •  levothyroxine 88 mcg tablet, Take 1 tablet (88 mcg total) by mouth daily, Disp: 30 tablet, Rfl: 5  •  Loratadine 10 MG CAPS, Take 10 mg by mouth, Disp: , Rfl:   •  olmesartan (BENICAR) 20 mg tablet, Take 1 tablet (20 mg total) by mouth daily, Disp: 180 tablet, Rfl: 0  •  rosuvastatin (CRESTOR) 10 MG tablet, Take 10 mg by mouth every evening, Disp: , Rfl:   •  aspirin (ECOTRIN) 325 mg EC tablet, Take 325 mg by mouth, Disp: , Rfl:   •  ipratropium (ATROVENT) 0.03 % nasal spray, 2 sprays into each nostril 3 (three) times a day (Patient not taking: Reported on 11/6/2023), Disp: 30 mL, Rfl: 5        Review of Systems:  Review of Systems   Constitutional:  Negative for activity change, fever and unexpected weight change. HENT:  Negative for facial swelling, nosebleeds and voice change. Respiratory:  Negative for chest tightness, shortness of breath and wheezing. Cardiovascular:  Negative for chest pain, palpitations and leg swelling. Gastrointestinal:  Negative for abdominal distention. Genitourinary:  Negative for hematuria. Musculoskeletal:  Negative for arthralgias. Skin:  Negative for color change, pallor, rash and wound. Neurological:  Negative for dizziness, seizures and syncope. Psychiatric/Behavioral:  Negative for agitation. Physical Exam:  Physical Exam  Vitals reviewed. Constitutional:       Appearance: She is well-developed. HENT:      Head: Normocephalic and atraumatic. Cardiovascular:      Rate and Rhythm: Normal rate and regular rhythm. Heart sounds: Normal heart sounds. Pulmonary:      Effort: Pulmonary effort is normal.      Breath sounds: Normal breath sounds. Abdominal:      Palpations: Abdomen is soft. Musculoskeletal:         General: Normal range of motion. Cervical back: Normal range of motion and neck supple. Skin:     General: Skin is warm and dry. Neurological:      Mental Status: She is alert and oriented to person, place, and time. Psychiatric:         Behavior: Behavior normal.         Thought Content:  Thought content normal.         Judgment: Judgment normal.         This note was completed in part utilizing M-Modal Fluency Direct Software. Grammatical errors, random word insertions, spelling mistakes, and incomplete sentences can be an occasional consequence of this system secondary to software limitations, ambient noise, and hardware issues. If you have any questions or concerns about the content, text, or information contained within the body of this dictation, please contact the provider for clarification.

## 2023-11-08 DIAGNOSIS — E78.5 HYPERLIPIDEMIA, UNSPECIFIED HYPERLIPIDEMIA TYPE: Primary | ICD-10-CM

## 2023-11-08 RX ORDER — ROSUVASTATIN CALCIUM 10 MG/1
10 TABLET, COATED ORAL EVERY EVENING
Qty: 90 TABLET | Refills: 1 | Status: SHIPPED | OUTPATIENT
Start: 2023-11-08

## 2023-11-20 DIAGNOSIS — I48.0 PAROXYSMAL ATRIAL FIBRILLATION (HCC): ICD-10-CM

## 2023-11-20 NOTE — TELEPHONE ENCOUNTER
Hi, this is Priti Yancey calling. I'm just calling to see if you got my request for medications and the overall and Eliquis, my birthday is [birth year] and let's see, my phone number is [phone]. Thanks a lot. Would appreciate a call that you got the message I sent. Thanks.  Bye.

## 2023-11-21 RX ORDER — AMIODARONE HYDROCHLORIDE 100 MG/1
100 TABLET ORAL DAILY
Qty: 90 TABLET | Refills: 0 | OUTPATIENT
Start: 2023-11-21 | End: 2024-02-19

## 2023-11-22 RX ORDER — AMIODARONE HYDROCHLORIDE 100 MG/1
100 TABLET ORAL DAILY
Qty: 30 TABLET | Refills: 0 | Status: SHIPPED | OUTPATIENT
Start: 2023-11-22 | End: 2023-11-28 | Stop reason: SDUPTHER

## 2023-11-22 NOTE — TELEPHONE ENCOUNTER
Spoke with pt. Notified pt of refill on Eliquis and that she must receive her amiodarone from cardiology. Pt stated that cardiology told her that she could receive her amiodarone from her PCP. Pt seemed upset saying that she wishes that her PCP and Cardiology would speak to each other and that she hopes her a fib does not kick in over Thanksgiving and hung up.

## 2023-11-28 DIAGNOSIS — I48.0 PAROXYSMAL ATRIAL FIBRILLATION (HCC): ICD-10-CM

## 2023-11-28 RX ORDER — AMIODARONE HYDROCHLORIDE 100 MG/1
100 TABLET ORAL DAILY
Qty: 90 TABLET | Refills: 0 | Status: SHIPPED | OUTPATIENT
Start: 2023-11-28 | End: 2024-02-26

## 2024-01-12 DIAGNOSIS — I10 PRIMARY HYPERTENSION: ICD-10-CM

## 2024-01-12 DIAGNOSIS — I48.0 PAROXYSMAL ATRIAL FIBRILLATION (HCC): ICD-10-CM

## 2024-01-12 RX ORDER — OLMESARTAN MEDOXOMIL 20 MG/1
20 TABLET ORAL DAILY
Qty: 180 TABLET | Refills: 0 | Status: SHIPPED | OUTPATIENT
Start: 2024-01-12 | End: 2024-07-10

## 2024-02-07 ENCOUNTER — TELEPHONE (OUTPATIENT)
Age: 84
End: 2024-02-07

## 2024-02-07 NOTE — TELEPHONE ENCOUNTER
Spoke with patient. I did give her your message and she said we will just see what happens. I asked her if she wanted me to schedule an appt with gyno for her and she said she will have to speak to Rachelle first.     Patient said she will contact us after she speaks with rachelle.

## 2024-02-07 NOTE — TELEPHONE ENCOUNTER
Patient stated she has been with a yeast infection for 2 weeks now. She has fried OTC Monistat I and II improved but as returned.    Patient denied scheduling with any other provider in the office due to bat experience with other providers.    Please review and advice  Thank you

## 2024-02-13 ENCOUNTER — TELEMEDICINE (OUTPATIENT)
Dept: FAMILY MEDICINE CLINIC | Facility: CLINIC | Age: 84
End: 2024-02-13
Payer: MEDICARE

## 2024-02-13 DIAGNOSIS — D61.818 PANCYTOPENIA (HCC): ICD-10-CM

## 2024-02-13 DIAGNOSIS — N89.8 VAGINAL IRRITATION: Primary | ICD-10-CM

## 2024-02-13 DIAGNOSIS — I48.0 PAROXYSMAL ATRIAL FIBRILLATION (HCC): Primary | ICD-10-CM

## 2024-02-13 DIAGNOSIS — I48.0 PAROXYSMAL ATRIAL FIBRILLATION (HCC): ICD-10-CM

## 2024-02-13 DIAGNOSIS — E03.9 ACQUIRED HYPOTHYROIDISM: ICD-10-CM

## 2024-02-13 DIAGNOSIS — E78.5 HYPERLIPIDEMIA, UNSPECIFIED HYPERLIPIDEMIA TYPE: ICD-10-CM

## 2024-02-13 DIAGNOSIS — I10 PRIMARY HYPERTENSION: ICD-10-CM

## 2024-02-13 PROCEDURE — 99441 PR PHYS/QHP TELEPHONE EVALUATION 5-10 MIN: CPT | Performed by: FAMILY MEDICINE

## 2024-02-13 RX ORDER — FLUCONAZOLE 150 MG/1
150 TABLET ORAL ONCE
Qty: 1 TABLET | Refills: 0 | Status: SHIPPED | OUTPATIENT
Start: 2024-02-13 | End: 2024-02-13

## 2024-02-13 RX ORDER — APIXABAN 2.5 MG/1
TABLET, FILM COATED ORAL
Qty: 180 TABLET | Refills: 0 | Status: SHIPPED | OUTPATIENT
Start: 2024-02-13

## 2024-02-13 NOTE — PROGRESS NOTES
Virtual Brief Visit    This Visit is being completed by telephone. The Patient is located at Home and in the following state in which I hold an active license [place]    The patient was identified by name and date of birth. Janice Ayala was informed that this is a telemedicine visit and that the visit is being conducted through the [employer] Teams platform. She agrees to proceed..  My office door was closed. No one else was in the room.  She acknowledged consent and understanding of privacy and security of the video platform. The patient has agreed to participate and understands they can discontinue the visit at any time.    Patient is aware this is a billable service.       Assessment/Plan:  1. Vaginal irritation  Reviewed patient's symptoms today.  Symptoms is unclear as to exact cause of her vaginal irritation.  She was advised last week that she needed to schedule an appointment with a gynecologist for further internal evaluation.  She was advised on the differential possible causes such as atrophic vaginitis.  At this time, she was given a prescription for Diflucan x 1 dose.  She was call and schedule an appointment with a GYN for further evaluation.  Patient as well as daughter understood this plan.  - Ambulatory Referral to Obstetrics / Gynecology; Future  - fluconazole (DIFLUCAN) 150 mg tablet; Take 1 tablet (150 mg total) by mouth once for 1 dose  Dispense: 1 tablet; Refill: 0    Problem List Items Addressed This Visit    None  Visit Diagnoses       Vaginal irritation    -  Primary    Relevant Medications    fluconazole (DIFLUCAN) 150 mg tablet    Other Relevant Orders    Ambulatory Referral to Obstetrics / Gynecology            Recent Visits  No visits were found meeting these conditions.  Showing recent visits within past 7 days and meeting all other requirements  Today's Visits  Date Type Provider Dept   02/13/24 Telemedicine DO [facility]   Showing today's visits and meeting all  other requirements  Future Appointments  No visits were found meeting these conditions.  Showing future appointments within next 150 days and meeting all other requirements     Patient is an 83-year-old female presents today for her virtual visit with her daughter.  She has been having problems with vaginal irritation for over 1 week.  She states that she has not been on any treatment such as antibiotics that could have caused the symptoms.  She denies any vaginal bleeding.  She has tried using over-the-counter Monistat however has not had any relief.         Visit Time  I have spent a total time of 7 minutes on 02/13/24 in caring for this patient including Prognosis, Risks and benefits of tx options, Instructions for management, Patient and family education, and Importance of tx compliance.

## 2024-02-15 ENCOUNTER — TELEPHONE (OUTPATIENT)
Age: 84
End: 2024-02-15

## 2024-02-15 NOTE — TELEPHONE ENCOUNTER
Pt was told by Dr. Pruett that if she had any trouble getting in to her obgyn to let him know. She wanted to see a specific provider but obgyn told her she is no longer in practice even though her name is in the portal. They scheduled her with a new provider Lainey Pearson and she is not sure that she is comfortable with that/that she is a crnp. Does Dr. Pruett have any suggestions for her?

## 2024-02-19 ENCOUNTER — APPOINTMENT (OUTPATIENT)
Dept: LAB | Facility: CLINIC | Age: 84
End: 2024-02-19
Payer: MEDICARE

## 2024-02-19 ENCOUNTER — TELEPHONE (OUTPATIENT)
Age: 84
End: 2024-02-19

## 2024-02-19 DIAGNOSIS — E03.9 ACQUIRED HYPOTHYROIDISM: ICD-10-CM

## 2024-02-19 DIAGNOSIS — C34.90 NON-SMALL CELL LUNG CANCER, UNSPECIFIED LATERALITY (HCC): ICD-10-CM

## 2024-02-19 DIAGNOSIS — E78.5 HYPERLIPIDEMIA, UNSPECIFIED HYPERLIPIDEMIA TYPE: ICD-10-CM

## 2024-02-19 DIAGNOSIS — I10 PRIMARY HYPERTENSION: ICD-10-CM

## 2024-02-19 DIAGNOSIS — D61.818 PANCYTOPENIA (HCC): ICD-10-CM

## 2024-02-19 DIAGNOSIS — R94.6 ABNORMAL RESULTS OF THYROID FUNCTION STUDIES: ICD-10-CM

## 2024-02-19 DIAGNOSIS — I48.0 PAROXYSMAL ATRIAL FIBRILLATION (HCC): ICD-10-CM

## 2024-02-19 LAB
ALBUMIN SERPL BCP-MCNC: 3.9 G/DL (ref 3.5–5)
ALP SERPL-CCNC: 62 U/L (ref 34–104)
ALT SERPL W P-5'-P-CCNC: 10 U/L (ref 7–52)
ANION GAP SERPL CALCULATED.3IONS-SCNC: 9 MMOL/L
AST SERPL W P-5'-P-CCNC: 17 U/L (ref 13–39)
BASOPHILS # BLD AUTO: 0.04 THOUSANDS/ÂΜL (ref 0–0.1)
BASOPHILS NFR BLD AUTO: 1 % (ref 0–1)
BILIRUB SERPL-MCNC: 0.49 MG/DL (ref 0.2–1)
BUN SERPL-MCNC: 25 MG/DL (ref 5–25)
CALCIUM SERPL-MCNC: 9.4 MG/DL (ref 8.4–10.2)
CHLORIDE SERPL-SCNC: 101 MMOL/L (ref 96–108)
CHOLEST SERPL-MCNC: 118 MG/DL
CO2 SERPL-SCNC: 26 MMOL/L (ref 21–32)
CREAT SERPL-MCNC: 1.52 MG/DL (ref 0.6–1.3)
EOSINOPHIL # BLD AUTO: 0.05 THOUSAND/ÂΜL (ref 0–0.61)
EOSINOPHIL NFR BLD AUTO: 1 % (ref 0–6)
ERYTHROCYTE [DISTWIDTH] IN BLOOD BY AUTOMATED COUNT: 12.6 % (ref 11.6–15.1)
FERRITIN SERPL-MCNC: 419 NG/ML (ref 11–307)
GFR SERPL CREATININE-BSD FRML MDRD: 31 ML/MIN/1.73SQ M
GLUCOSE P FAST SERPL-MCNC: 89 MG/DL (ref 65–99)
HCT VFR BLD AUTO: 37.9 % (ref 34.8–46.1)
HDLC SERPL-MCNC: 56 MG/DL
HGB BLD-MCNC: 12.4 G/DL (ref 11.5–15.4)
IMM GRANULOCYTES # BLD AUTO: 0.02 THOUSAND/UL (ref 0–0.2)
IMM GRANULOCYTES NFR BLD AUTO: 0 % (ref 0–2)
IRON SATN MFR SERPL: 45 % (ref 15–50)
IRON SERPL-MCNC: 96 UG/DL (ref 50–212)
LDLC SERPL CALC-MCNC: 43 MG/DL (ref 0–100)
LYMPHOCYTES # BLD AUTO: 2.01 THOUSANDS/ÂΜL (ref 0.6–4.47)
LYMPHOCYTES NFR BLD AUTO: 24 % (ref 14–44)
MCH RBC QN AUTO: 32.5 PG (ref 26.8–34.3)
MCHC RBC AUTO-ENTMCNC: 32.7 G/DL (ref 31.4–37.4)
MCV RBC AUTO: 99 FL (ref 82–98)
MONOCYTES # BLD AUTO: 0.73 THOUSAND/ÂΜL (ref 0.17–1.22)
MONOCYTES NFR BLD AUTO: 9 % (ref 4–12)
NEUTROPHILS # BLD AUTO: 5.51 THOUSANDS/ÂΜL (ref 1.85–7.62)
NEUTS SEG NFR BLD AUTO: 65 % (ref 43–75)
NRBC BLD AUTO-RTO: 0 /100 WBCS
PLATELET # BLD AUTO: 251 THOUSANDS/UL (ref 149–390)
PMV BLD AUTO: 9.7 FL (ref 8.9–12.7)
POTASSIUM SERPL-SCNC: 5.2 MMOL/L (ref 3.5–5.3)
PROT SERPL-MCNC: 7 G/DL (ref 6.4–8.4)
RBC # BLD AUTO: 3.82 MILLION/UL (ref 3.81–5.12)
SODIUM SERPL-SCNC: 136 MMOL/L (ref 135–147)
TIBC SERPL-MCNC: 214 UG/DL (ref 250–450)
TRIGL SERPL-MCNC: 97 MG/DL
TSH SERPL DL<=0.05 MIU/L-ACNC: 2.95 UIU/ML (ref 0.45–4.5)
UIBC SERPL-MCNC: 118 UG/DL (ref 155–355)
WBC # BLD AUTO: 8.36 THOUSAND/UL (ref 4.31–10.16)

## 2024-02-19 PROCEDURE — 80061 LIPID PANEL: CPT

## 2024-02-19 PROCEDURE — 83540 ASSAY OF IRON: CPT

## 2024-02-19 PROCEDURE — 36415 COLL VENOUS BLD VENIPUNCTURE: CPT

## 2024-02-19 PROCEDURE — 84443 ASSAY THYROID STIM HORMONE: CPT

## 2024-02-19 PROCEDURE — 83550 IRON BINDING TEST: CPT

## 2024-02-19 PROCEDURE — 82728 ASSAY OF FERRITIN: CPT

## 2024-02-19 PROCEDURE — 85025 COMPLETE CBC W/AUTO DIFF WBC: CPT

## 2024-02-19 PROCEDURE — 80053 COMPREHEN METABOLIC PANEL: CPT

## 2024-02-19 NOTE — TELEPHONE ENCOUNTER
The soonest GYN could get pt in at [facility] (pt requested [facility]) is Thursday 2/22/24 with Dr. Miner. Pt's daughter-in-law asking if a short script for Diflucan could be called in to help pt with her symptoms until her appt.

## 2024-02-19 NOTE — TELEPHONE ENCOUNTER
Last seen 02/13/2024 Virtual  Next appt 0/06/2024 AWV    Patient stated that the fluconazole (Diflucan) 150 mg tablet that was prescribed helped get rid off the yeast infection, however just yesterday the infection came back. Patient stated she is having trouble sleeping. Patient made an appointment with her gynecologist, but it is not until 02/27/2024. Patient asked if medication can be prescribed and sent to [facility] pharmacy. Patient can be reached at [phone]. Please advise.

## 2024-02-20 DIAGNOSIS — R79.89 ELEVATED SERUM CREATININE: Primary | ICD-10-CM

## 2024-02-22 ENCOUNTER — TELEPHONE (OUTPATIENT)
Age: 84
End: 2024-02-22

## 2024-02-22 ENCOUNTER — OFFICE VISIT (OUTPATIENT)
Dept: OBGYN CLINIC | Facility: CLINIC | Age: 84
End: 2024-02-22
Payer: MEDICARE

## 2024-02-22 VITALS
WEIGHT: 131 LBS | DIASTOLIC BLOOD PRESSURE: 78 MMHG | BODY MASS INDEX: 24.73 KG/M2 | HEIGHT: 61 IN | SYSTOLIC BLOOD PRESSURE: 128 MMHG

## 2024-02-22 DIAGNOSIS — N90.89 VULVAR IRRITATION: Primary | ICD-10-CM

## 2024-02-22 DIAGNOSIS — N90.89 VULVAR IRRITATION: ICD-10-CM

## 2024-02-22 PROCEDURE — 99203 OFFICE O/P NEW LOW 30 MIN: CPT | Performed by: OBSTETRICS & GYNECOLOGY

## 2024-02-22 RX ORDER — NYSTATIN AND TRIAMCINOLONE ACETONIDE 100000; 1 [USP'U]/G; MG/G
OINTMENT TOPICAL 2 TIMES DAILY
Qty: 30 G | Refills: 5 | Status: SHIPPED | OUTPATIENT
Start: 2024-02-22 | End: 2024-02-22 | Stop reason: SDUPTHER

## 2024-02-22 RX ORDER — NYSTATIN AND TRIAMCINOLONE ACETONIDE 100000; 1 [USP'U]/G; MG/G
OINTMENT TOPICAL 2 TIMES DAILY
Qty: 30 G | Refills: 5 | Status: SHIPPED | OUTPATIENT
Start: 2024-02-22

## 2024-02-22 RX ORDER — FLUCONAZOLE 150 MG/1
TABLET ORAL
COMMUNITY
Start: 2024-02-13 | End: 2024-02-22 | Stop reason: ALTCHOICE

## 2024-02-22 NOTE — PROGRESS NOTES
Assessment Janice was seen today for vaginitis.    Diagnoses and all orders for this visit:    Vulvar irritation  -     nystatin-triamcinolone (MYCOLOG-II) ointment; Apply topically 2 (two) times a day         Plan  Patient to start Mycolog-II ointment to the vulva twice daily x 5 to 7 days.  After that she can use it as needed.  Instructions reviewed and prescription sent to patient's pharmacy.  All questions answered.    Subjective   Janice Ayala is a 83 y.o. female here for a problem visit.  Patient is complaining of external burning.  Pt was given diflucan which helped for 7 days.  Sx's have since restarted.  Pt denies any VD and VB.  Pt denies any recent antibiotics, med changes, changes in soaps or detergents.  No urinary symptoms.      Patient Active Problem List   Diagnosis    Acquired hypothyroidism    Action tremor    Asthma    Stage 3a chronic kidney disease (HCC)    Hyperlipidemia    Non-small cell carcinoma of left lung (HCC)    Pancytopenia (HCC)    Peripheral vascular disease (HCC)    Paroxysmal atrial fibrillation (HCC)    Iron deficiency    Chronic nasal congestion    COPD mixed type (HCC)       Gynecologic History  No LMP recorded. Patient is postmenopausal.  The current method of family planning is post menopausal status.    Past Medical History:   Diagnosis Date    Afib (HCC)     Chronic kidney disease     Essential hypertension     Hyperlipidemia     Osteoporosis     Thyroid disease      Past Surgical History:   Procedure Laterality Date    CAROTID ENDARTARECTOMY Left     CT NEEDLE BIOPSY LUNG  10/5/2020    HIP SURGERY Bilateral     LUNG SURGERY Left     US GUIDED THYROID BIOPSY  10/28/2020     Family History   Problem Relation Age of Onset    Heart disease Mother     Heart disease Father     Kidney disease Father      Social History     Socioeconomic History    Marital status:      Spouse name: Not on file    Number of children: Not on file    Years of education: Not on file    Highest  education level: Not on file   Occupational History    Not on file   Tobacco Use    Smoking status: Former     Current packs/day: 0.00     Types: Cigarettes     Start date:      Quit date:      Years since quittin.1    Smokeless tobacco: Never   Vaping Use    Vaping status: Never Used   Substance and Sexual Activity    Alcohol use: Yes     Comment: rarely    Drug use: Not on file    Sexual activity: Not on file   Other Topics Concern    Not on file   Social History Narrative    Not on file     Social Determinants of Health     Financial Resource Strain: Low Risk  (3/29/2022)    Received from [facility]    Overall Financial Resource Strain (CARDIA)     Difficulty of Paying Living Expenses: Not hard at all   Food Insecurity: No Food Insecurity (3/29/2022)    Received from [facility]    Hunger Vital Sign     Worried About Running Out of Food in the Last Year: Never true     Ran Out of Food in the Last Year: Never true   Transportation Needs: No Transportation Needs (3/29/2022)    Received from [facility]    PRAPARE - Transportation     Lack of Transportation (Medical): No     Lack of Transportation (Non-Medical): No   Physical Activity: Not on file   Stress: No Stress Concern Present (2022)    Received from [facility]    [employer] - Occupational Stress Questionnaire     Feeling of Stress : Not at all   Social Connections: Not on file   Intimate Partner Violence: Not At Risk (2022)    Received from [facility]    Intimate Partner Violence     Within the last year, have you been afraid of your partner, ex-partner or family member?: 2     Within the last year, have you been humiliated or emotionally abused in other ways by your partner, ex-partner or family member?: 2     Within the last year, have you been kicked, hit, slapped, or otherwise physically hurt by your partner, ex-partner or family member?:  2     Within the last year, have you been raped or forced to have any kind of sexual activity by your partner, ex-partner or family member?: 2   Housing Stability: Unknown (3/29/2022)    Received from [facility]    Housing Stability Vital Sign     Unable to Pay for Housing in the Last Year: No     Number of Places Lived in the Last Year: Not on file     Unstable Housing in the Last Year: No     Allergies   Allergen Reactions    Gabapentin Other (See Comments)     Weakness    Other Eye Swelling     Seasonal Allergies         Current Outpatient Medications:     amiodarone 100 mg tablet, Take 1 tablet (100 mg total) by mouth daily, Disp: 90 tablet, Rfl: 0    Calcium Carbonate (CALTRATE 600 PO), Take by mouth AS needed, Disp: , Rfl:     dextromethorphan-guaifenesin (MUCINEX DM)  MG per 12 hr tablet, Take 1 tablet by mouth if needed for cough AS needed, Disp: , Rfl:     docusate sodium (COLACE) 100 mg capsule, Take 100 mg by mouth if needed for constipation AS needed, Disp: , Rfl:     Eliquis 2.5 MG, Take 1 tablet (2.5 mg total) by mouth 2 (two) times a day, Disp: 180 tablet, Rfl: 0    esomeprazole (NexIUM) 40 MG capsule, Take 1 capsule (40 mg total) by mouth in the morning, Disp: 90 capsule, Rfl: 1    ipratropium (ATROVENT) 0.03 % nasal spray, 2 sprays into each nostril 3 (three) times a day, Disp: 30 mL, Rfl: 5    ipratropium-albuterol (COMBIVENT RESPIMAT) inhaler, Inhale 1 puff every 6 (six) hours as needed (wheezing), Disp: 4 g, Rfl: 5    levothyroxine 88 mcg tablet, Take 1 tablet (88 mcg total) by mouth daily, Disp: 30 tablet, Rfl: 5    Loratadine 10 MG CAPS, Take 10 mg by mouth, Disp: , Rfl:     nystatin-triamcinolone (MYCOLOG-II) ointment, Apply topically 2 (two) times a day, Disp: 30 g, Rfl: 5    olmesartan (BENICAR) 20 mg tablet, Take 1 tablet (20 mg total) by mouth daily, Disp: 180 tablet, Rfl: 0    rosuvastatin (CRESTOR) 10 MG tablet, Take 1 tablet (10 mg total) by mouth every evening,  "Disp: 90 tablet, Rfl: 1    Review of Systems  Constitutional :no fever, feels well, no tiredness, no recent weight gain or loss  ENT: no ear ache, no loss of hearing, no nosebleeds or nasal discharge, no sore throat or hoarseness.  Cardiovascular: no complaints of slow or fast heart beat, no chest pain, no palpitations, no leg claudication or lower extremity edema.  Respiratory: no complaints of shortness of shortness of breath, no HARRIS  Breasts:no complaints of breast pain, breast lump, or nipple discharge  Gastrointestinal: no complaints of abdominal pain, constipation, nausea, vomiting, or diarrhea or bloody stools  Genitourinary : no complaints of dysuria, incontinence, pelvic pain, no dysmenorrhea, vaginal discharge or abnormal vaginal bleeding and as noted in HPI.  Musculoskeletal: no complaints of arthralgia, no myalgia, no joint swelling or stiffness, no limb pain or swelling.  Integumentary: no complaints of skin rash or lesion, itching or dry skin  Neurological: no complaints of headache, no confusion, no numbness or tingling, no dizziness or fainting     Objective     /78   Ht 5' 1\" (1.549 m)   Wt 59.4 kg (131 lb)   BMI 24.75 kg/m²     General Appears stated age, cooperative, alert normal mood and affect   Psychiatric oriented to person, place and time.  Mood and affect normal   Vulva: Vulvar probed with a swab.  Patient reports irritation throughout bilateral labia.  3 mm sebaceous cyst noted on left labia nontender.  Mild erythema of bilateral labia      "

## 2024-02-22 NOTE — TELEPHONE ENCOUNTER
[employer] internet shortage, pt is requesting to have her new script sent over again. Pharmacy did not receive it.

## 2024-02-22 NOTE — PATIENT INSTRUCTIONS
Thank you for your confidence in our team.   We appreciate you and welcome your feedback.   If you receive a survey from us, please take a few moments to let us know how we are doing.   Sincerely,   Jeane Miner MD

## 2024-02-22 NOTE — TELEPHONE ENCOUNTER
Received call from [place] at [facility] stating that e-prescribing is not working and asking for verbal order of nystatin-triamcinolone cream. Reviewed information of prescription.

## 2024-02-23 ENCOUNTER — TELEPHONE (OUTPATIENT)
Age: 84
End: 2024-02-23

## 2024-02-23 NOTE — TELEPHONE ENCOUNTER
Patient called.  Had office visit yesterday 2/22/24 with Dr. Miner. She was given Nystatin-Triamcinolone cream to use for vulvar irritation. Patient applied it twice as directed but c/o her vulvar irritation is much worse, severe burning, was unable to sleep.  She requests an alternative cream sent to her [facility] pharmacy.  Advised can use cold compress and OTC Aquaphor to soothe the area while awaiting a new Rx.

## 2024-02-26 ENCOUNTER — TELEPHONE (OUTPATIENT)
Age: 84
End: 2024-02-26

## 2024-02-26 DIAGNOSIS — N90.89 VULVAR IRRITATION: Primary | ICD-10-CM

## 2024-02-26 RX ORDER — CLOTRIMAZOLE AND BETAMETHASONE DIPROPIONATE 10; .64 MG/G; MG/G
CREAM TOPICAL 2 TIMES DAILY
Qty: 45 G | Refills: 3 | Status: SHIPPED | OUTPATIENT
Start: 2024-02-26

## 2024-02-26 NOTE — TELEPHONE ENCOUNTER
Pt was seen and evaluated by GYN (Dr. Miner) on 2/2224. She was prescribed Nystatin which she had an allergic reaction to.  Pt called Dr. Miner's office and the nurse told her to discontinue the Nystatin and gave her a recommendation for an OTC medication to try.   This morning, Dr. Miner called in LOTRISONE to pt's pharamacy, but pt was unaware as no one called her. I advised pt that this was called in by  and was received by the pharmacy at 9:41 this morning.   Pt was advised that because she was evaluated and is being treated by Dr. iMner, that she would need to call Dr. Miner's office with questions and concerns regarding this issue.   Pt advised to  the LOTRISONE and see how that works for her.

## 2024-02-26 NOTE — TELEPHONE ENCOUNTER
Patient is upset she has done everything both doctor's  have ask and still has the infection. Wants something to clear this up.Please call

## 2024-03-02 DIAGNOSIS — E03.9 ACQUIRED HYPOTHYROIDISM: ICD-10-CM

## 2024-03-02 DIAGNOSIS — K21.9 GASTROESOPHAGEAL REFLUX DISEASE WITHOUT ESOPHAGITIS: ICD-10-CM

## 2024-03-02 RX ORDER — LEVOTHYROXINE SODIUM 88 UG/1
88 TABLET ORAL DAILY
Qty: 30 TABLET | Refills: 5 | Status: SHIPPED | OUTPATIENT
Start: 2024-03-02

## 2024-03-02 RX ORDER — ESOMEPRAZOLE MAGNESIUM 40 MG/1
40 CAPSULE, DELAYED RELEASE ORAL EVERY MORNING
Qty: 90 CAPSULE | Refills: 1 | Status: SHIPPED | OUTPATIENT
Start: 2024-03-02

## 2024-03-04 RX ORDER — LEVOTHYROXINE SODIUM 88 UG/1
88 TABLET ORAL DAILY
Qty: 30 TABLET | Refills: 0 | Status: SHIPPED | OUTPATIENT
Start: 2024-03-04 | End: 2024-03-06 | Stop reason: SDUPTHER

## 2024-03-06 ENCOUNTER — OFFICE VISIT (OUTPATIENT)
Dept: FAMILY MEDICINE CLINIC | Facility: CLINIC | Age: 84
End: 2024-03-06
Payer: MEDICARE

## 2024-03-06 VITALS
BODY MASS INDEX: 24.51 KG/M2 | SYSTOLIC BLOOD PRESSURE: 110 MMHG | TEMPERATURE: 97.7 F | DIASTOLIC BLOOD PRESSURE: 80 MMHG | OXYGEN SATURATION: 95 % | WEIGHT: 129.8 LBS | HEART RATE: 92 BPM | HEIGHT: 61 IN

## 2024-03-06 DIAGNOSIS — C34.92 NON-SMALL CELL CARCINOMA OF LEFT LUNG (HCC): ICD-10-CM

## 2024-03-06 DIAGNOSIS — N18.32 ACUTE RENAL FAILURE WITH ACUTE TUBULAR NECROSIS SUPERIMPOSED ON STAGE 3B CHRONIC KIDNEY DISEASE (HCC): ICD-10-CM

## 2024-03-06 DIAGNOSIS — E03.9 ACQUIRED HYPOTHYROIDISM: ICD-10-CM

## 2024-03-06 DIAGNOSIS — N17.0 ACUTE RENAL FAILURE WITH ACUTE TUBULAR NECROSIS SUPERIMPOSED ON STAGE 3B CHRONIC KIDNEY DISEASE (HCC): ICD-10-CM

## 2024-03-06 DIAGNOSIS — J45.909 UNCOMPLICATED ASTHMA, UNSPECIFIED ASTHMA SEVERITY, UNSPECIFIED WHETHER PERSISTENT: ICD-10-CM

## 2024-03-06 DIAGNOSIS — J44.9 COPD MIXED TYPE (HCC): ICD-10-CM

## 2024-03-06 DIAGNOSIS — D61.818 PANCYTOPENIA (HCC): ICD-10-CM

## 2024-03-06 DIAGNOSIS — N89.8 VAGINAL IRRITATION: ICD-10-CM

## 2024-03-06 DIAGNOSIS — I46.9 CARDIAC ARREST, CAUSE UNSPECIFIED (HCC): ICD-10-CM

## 2024-03-06 DIAGNOSIS — Z00.00 MEDICARE ANNUAL WELLNESS VISIT, SUBSEQUENT: Primary | ICD-10-CM

## 2024-03-06 DIAGNOSIS — I48.0 PAROXYSMAL ATRIAL FIBRILLATION (HCC): ICD-10-CM

## 2024-03-06 DIAGNOSIS — I10 PRIMARY HYPERTENSION: ICD-10-CM

## 2024-03-06 DIAGNOSIS — I73.9 PERIPHERAL VASCULAR DISEASE (HCC): ICD-10-CM

## 2024-03-06 DIAGNOSIS — E78.5 HYPERLIPIDEMIA, UNSPECIFIED HYPERLIPIDEMIA TYPE: ICD-10-CM

## 2024-03-06 PROCEDURE — G0439 PPPS, SUBSEQ VISIT: HCPCS | Performed by: FAMILY MEDICINE

## 2024-03-06 PROCEDURE — 99214 OFFICE O/P EST MOD 30 MIN: CPT | Performed by: FAMILY MEDICINE

## 2024-03-06 RX ORDER — LEVOTHYROXINE SODIUM 88 UG/1
88 TABLET ORAL DAILY
Qty: 30 TABLET | Refills: 5 | Status: SHIPPED | OUTPATIENT
Start: 2024-03-06

## 2024-03-06 RX ORDER — ROSUVASTATIN CALCIUM 10 MG/1
10 TABLET, COATED ORAL EVERY EVENING
Qty: 30 TABLET | Refills: 5 | Status: SHIPPED | OUTPATIENT
Start: 2024-03-06 | End: 2024-04-05

## 2024-03-06 RX ORDER — AMIODARONE HYDROCHLORIDE 100 MG/1
100 TABLET ORAL DAILY
Qty: 30 TABLET | Refills: 5 | Status: SHIPPED | OUTPATIENT
Start: 2024-03-06 | End: 2024-04-05

## 2024-03-06 RX ORDER — OLMESARTAN MEDOXOMIL 20 MG/1
20 TABLET ORAL DAILY
Qty: 30 TABLET | Refills: 5 | Status: SHIPPED | OUTPATIENT
Start: 2024-03-06 | End: 2024-04-05

## 2024-03-06 NOTE — PATIENT INSTRUCTIONS
Medicare Preventive Visit Patient Instructions  Thank you for completing your Welcome to Medicare Visit or Medicare Annual Wellness Visit today. Your next wellness visit will be due in one year (3/7/2025).  The screening/preventive services that you may require over the next 5-10 years are detailed below. Some tests may not apply to you based off risk factors and/or age. Screening tests ordered at today's visit but not completed yet may show as past due. Also, please note that scanned in results may not display below.  Preventive Screenings:  Service Recommendations Previous Testing/Comments   Colorectal Cancer Screening  * Colonoscopy    * Fecal Occult Blood Test (FOBT)/Fecal Immunochemical Test (FIT)  * Fecal DNA/Cologuard Test  * Flexible Sigmoidoscopy Age: 45-75 years old   Colonoscopy: every 10 years (may be performed more frequently if at higher risk)  OR  FOBT/FIT: every 1 year  OR  Cologuard: every 3 years  OR  Sigmoidoscopy: every 5 years  Screening may be recommended earlier than age 45 if at higher risk for colorectal cancer. Also, an individualized decision between you and your healthcare provider will decide whether screening between the ages of 76-85 would be appropriate. Colonoscopy: Not on file  FOBT/FIT: Not on file  Cologuard: Not on file  Sigmoidoscopy: Not on file    Screening Not Indicated     Breast Cancer Screening Age: 40+ years old  Frequency: every 1-2 years  Not required if history of left and right mastectomy Mammogram: Not on file        Cervical Cancer Screening Between the ages of 21-29, pap smear recommended once every 3 years.   Between the ages of 30-65, can perform pap smear with HPV co-testing every 5 years.   Recommendations may differ for women with a history of total hysterectomy, cervical cancer, or abnormal pap smears in past. Pap Smear: Not on file    Screening Not Indicated   Hepatitis C Screening Once for adults born between 1945 and 1965  More frequently in patients at  high risk for Hepatitis C Hep C Antibody: Not on file        Diabetes Screening 1-2 times per year if you're at risk for diabetes or have pre-diabetes Fasting glucose: 89 mg/dL (2/19/2024)  A1C: No results in last 5 years (No results in last 5 years)  Screening Current   Cholesterol Screening Once every 5 years if you don't have a lipid disorder. May order more often based on risk factors. Lipid panel: 02/19/2024    Screening Not Indicated  History Lipid Disorder     Other Preventive Screenings Covered by Medicare:  Abdominal Aortic Aneurysm (AAA) Screening: covered once if your at risk. You're considered to be at risk if you have a family history of AAA.  Lung Cancer Screening: covers low dose CT scan once per year if you meet all of the following conditions: (1) Age 55-77; (2) No signs or symptoms of lung cancer; (3) Current smoker or have quit smoking within the last 15 years; (4) You have a tobacco smoking history of at least 20 pack years (packs per day multiplied by number of years you smoked); (5) You get a written order from a healthcare provider.  Glaucoma Screening: covered annually if you're considered high risk: (1) You have diabetes OR (2) Family history of glaucoma OR (3)  aged 50 and older OR (4)  American aged 65 and older  Osteoporosis Screening: covered every 2 years if you meet one of the following conditions: (1) You're estrogen deficient and at risk for osteoporosis based off medical history and other findings; (2) Have a vertebral abnormality; (3) On glucocorticoid therapy for more than 3 months; (4) Have primary hyperparathyroidism; (5) On osteoporosis medications and need to assess response to drug therapy.   Last bone density test (DXA Scan): 09/30/2021.  HIV Screening: covered annually if you're between the age of 15-65. Also covered annually if you are younger than 15 and older than 65 with risk factors for HIV infection. For pregnant patients, it is covered up to 3  times per pregnancy.    Immunizations:  Immunization Recommendations   Influenza Vaccine Annual influenza vaccination during flu season is recommended for all persons aged >= 6 months who do not have contraindications   Pneumococcal Vaccine   * Pneumococcal conjugate vaccine = PCV13 (Prevnar 13), PCV15 (Vaxneuvance), PCV20 (Prevnar 20)  * Pneumococcal polysaccharide vaccine = PPSV23 (Pneumovax) Adults 19-63 yo with certain risk factors or if 65+ yo  If never received any pneumonia vaccine: recommend Prevnar 20 (PCV20)  Give PCV20 if previously received 1 dose of PCV13 or PPSV23   Hepatitis B Vaccine 3 dose series if at intermediate or high risk (ex: diabetes, end stage renal disease, liver disease)   Respiratory syncytial virus (RSV) Vaccine - COVERED BY MEDICARE PART D  * RSVPreF3 (Arexvy) CDC recommends that adults 60 years of age and older may receive a single dose of RSV vaccine using shared clinical decision-making (SCDM)   Tetanus (Td) Vaccine - COST NOT COVERED BY MEDICARE PART B Following completion of primary series, a booster dose should be given every 10 years to maintain immunity against tetanus. Td may also be given as tetanus wound prophylaxis.   Tdap Vaccine - COST NOT COVERED BY MEDICARE PART B Recommended at least once for all adults. For pregnant patients, recommended with each pregnancy.   Shingles Vaccine (Shingrix) - COST NOT COVERED BY MEDICARE PART B  2 shot series recommended in those 19 years and older who have or will have weakened immune systems or those 50 years and older     Health Maintenance Due:  There are no preventive care reminders to display for this patient.  Immunizations Due:      Topic Date Due   • Influenza Vaccine (1) 09/01/2023   • COVID-19 Vaccine (6 - 2023-24 season) 09/01/2023     Advance Directives   What are advance directives?  Advance directives are legal documents that state your wishes and plans for medical care. These plans are made ahead of time in case you lose  your ability to make decisions for yourself. Advance directives can apply to any medical decision, such as the treatments you want, and if you want to donate organs.   What are the types of advance directives?  There are many types of advance directives, and each state has rules about how to use them. You may choose a combination of any of the following:  Living will:  This is a written record of the treatment you want. You can also choose which treatments you do not want, which to limit, and which to stop at a certain time. This includes surgery, medicine, IV fluid, and tube feedings.   Durable power of  for healthcare (DPAHC):  This is a written record that states who you want to make healthcare choices for you when you are unable to make them for yourself. This person, called a proxy, is usually a family member or a friend. You may choose more than 1 proxy.  Do not resuscitate (DNR) order:  A DNR order is used in case your heart stops beating or you stop breathing. It is a request not to have certain forms of treatment, such as CPR. A DNR order may be included in other types of advance directives.  Medical directive:  This covers the care that you want if you are in a coma, near death, or unable to make decisions for yourself. You can list the treatments you want for each condition. Treatment may include pain medicine, surgery, blood transfusions, dialysis, IV or tube feedings, and a ventilator (breathing machine).  Values history:  This document has questions about your views, beliefs, and how you feel and think about life. This information can help others choose the care that you would choose.  Why are advance directives important?  An advance directive helps you control your care. Although spoken wishes may be used, it is better to have your wishes written down. Spoken wishes can be misunderstood, or not followed. Treatments may be given even if you do not want them. An advance directive may make it  easier for your family to make difficult choices about your care.   Fall Prevention    Fall prevention  includes ways to make your home and other areas safer. It also includes ways you can move more carefully to prevent a fall. Health conditions that cause changes in your blood pressure, vision, or muscle strength and coordination may increase your risk for falls. Medicines may also increase your risk for falls if they make you dizzy, weak, or sleepy.   Fall prevention tips:   Stand or sit up slowly.    Use assistive devices as directed.    Wear shoes that fit well and have soles that .    Wear a personal alarm.    Stay active.    Manage your medical conditions.    Home Safety Tips:  Add items to prevent falls in the bathroom.    Keep paths clear.    Install bright lights in your home.    Keep items you use often on shelves within reach.    Paint or place reflective tape on the edges of your stairs.       © Copyright [employer] 2018 Information is for End User's use only and may not be sold, redistributed or otherwise used for commercial purposes. All illustrations and images included in CareNotes® are the copyrighted property of [employer].A[employer]M., Inc[employer] or [employer]

## 2024-03-06 NOTE — PROGRESS NOTES
Assessment and Plan:   1. Non-small cell carcinoma of left lung (HCC)  Patient following with hematology/oncology.  Recent CT appear to show significant new masslike consolidation in the left lung.  She is scheduled next month for a PET scan.  Will continue to follow with these results.    2. Paroxysmal atrial fibrillation (HCC)  Clinically asymptomatic.  Patient was seen previously by cardiology however she states that she does not wish to see his cardiologist further.  At this time, she denies chest pain or palpitations.  Will refer patient to a new cardiologist.  Continue with her current treatment of amiodarone, olmesartan, Eliquis.  - olmesartan (BENICAR) 20 mg tablet; Take 1 tablet (20 mg total) by mouth daily  Dispense: 30 tablet; Refill: 5  - amiodarone 100 mg tablet; Take 1 tablet (100 mg total) by mouth daily  Dispense: 30 tablet; Refill: 5  - Ambulatory Referral to Cardiology; Future    3. Primary hypertension  Stable.  Continue with your current treatment for olmesartan.  - olmesartan (BENICAR) 20 mg tablet; Take 1 tablet (20 mg total) by mouth daily  Dispense: 30 tablet; Refill: 5    4. Hyperlipidemia, unspecified hyperlipidemia type  Recheck lipid panel.  Continue with a strict low-fat/low-cholesterol diet as well as her current treatment with Crestor.  - rosuvastatin (CRESTOR) 10 MG tablet; Take 1 tablet (10 mg total) by mouth every evening  Dispense: 30 tablet; Refill: 5    5. Acquired hypothyroidism  Recheck TSH.  Continue with the current dose of levothyroxine.  - levothyroxine 88 mcg tablet; Take 1 tablet (88 mcg total) by mouth daily  Dispense: 30 tablet; Refill: 5    6. . Vaginal irritation  Patient symptoms appear stable.  Continue with her current treatment of clotrimazole/betamethasone        Problem List Items Addressed This Visit          Endocrine    Acquired hypothyroidism    Relevant Medications    levothyroxine 88 mcg tablet       Respiratory    Asthma    Non-small cell carcinoma of  left lung (HCC)    COPD mixed type (HCC)       Cardiovascular and Mediastinum    Peripheral vascular disease (HCC)    Paroxysmal atrial fibrillation (HCC)    Relevant Medications    olmesartan (BENICAR) 20 mg tablet    amiodarone 100 mg tablet    Other Relevant Orders    Ambulatory Referral to Cardiology    Cardiac arrest, cause unspecified (HCC)       Hematopoietic and Hemostatic    Pancytopenia (HCC)       Genitourinary    Acute renal failure with acute tubular necrosis superimposed on stage 3b chronic kidney disease (HCC)       Other    Hyperlipidemia    Relevant Medications    rosuvastatin (CRESTOR) 10 MG tablet     Other Visit Diagnoses       Medicare annual wellness visit, subsequent    -  Primary    Primary hypertension        Relevant Medications    olmesartan (BENICAR) 20 mg tablet             Preventive health issues were discussed with patient, and age appropriate screening tests were ordered as noted in patient's After Visit Summary.  Personalized health advice and appropriate referrals for health education or preventive services given if needed, as noted in patient's After Visit Summary.     History of Present Illness:     Patient presents for a Medicare Wellness Visit    HPI   Patient Care Team:  Carrillo Pruett DO as PCP - General (Family Medicine)     Review of Systems:     Review of Systems   Constitutional:  Negative for activity change, chills, fatigue and fever.   HENT:  Negative for congestion, ear pain, sinus pressure and sore throat.    Eyes:  Negative for redness, itching and visual disturbance.   Respiratory:  Negative for cough and shortness of breath.    Cardiovascular:  Negative for chest pain and palpitations.   Gastrointestinal:  Negative for abdominal pain, diarrhea and nausea.   Endocrine: Negative for cold intolerance and heat intolerance.   Genitourinary:  Negative for dysuria, flank pain and frequency.   Musculoskeletal:  Negative for arthralgias, back pain, gait problem and  myalgias.   Skin:  Negative for color change.   Allergic/Immunologic: Negative for environmental allergies.   Neurological:  Negative for dizziness, numbness and headaches.   Psychiatric/Behavioral:  Negative for behavioral problems and sleep disturbance.         Problem List:     Patient Active Problem List   Diagnosis    Acquired hypothyroidism    Action tremor    Asthma    Stage 3a chronic kidney disease (HCC)    Hyperlipidemia    Non-small cell carcinoma of left lung (HCC)    Pancytopenia (HCC)    Peripheral vascular disease (HCC)    Paroxysmal atrial fibrillation (HCC)    Iron deficiency    Chronic nasal congestion    COPD mixed type (HCC)    Cardiac arrest, cause unspecified (HCC)    Acute renal failure with acute tubular necrosis superimposed on stage 3b chronic kidney disease (HCC)      Past Medical and Surgical History:     Past Medical History:   Diagnosis Date    Afib (HCC)     Chronic kidney disease     Essential hypertension     Hyperlipidemia     Osteoporosis     Thyroid disease      Past Surgical History:   Procedure Laterality Date    CAROTID ENDARTARECTOMY Left     CT NEEDLE BIOPSY LUNG  10/5/2020    HIP SURGERY Bilateral     LUNG SURGERY Left     US GUIDED THYROID BIOPSY  10/28/2020      Family History:     Family History   Problem Relation Age of Onset    Heart disease Mother     Heart disease Father     Kidney disease Father       Social History:     Social History     Socioeconomic History    Marital status:      Spouse name: None    Number of children: None    Years of education: None    Highest education level: None   Occupational History    None   Tobacco Use    Smoking status: Former     Current packs/day: 0.00     Types: Cigarettes     Start date:      Quit date:      Years since quittin.1    Smokeless tobacco: Never   Vaping Use    Vaping status: Never Used   Substance and Sexual Activity    Alcohol use: Yes     Comment: rarely    Drug use: Never    Sexual activity:  None   Other Topics Concern    None   Social History Narrative    None     Social Determinants of Health     Financial Resource Strain: Low Risk  (3/6/2024)    Overall Financial Resource Strain (CARDIA)     Difficulty of Paying Living Expenses: Not hard at all   Food Insecurity: No Food Insecurity (3/29/2022)    Received from [facility]    Hunger Vital Sign     Worried About Running Out of Food in the Last Year: Never true     Ran Out of Food in the Last Year: Never true   Transportation Needs: No Transportation Needs (3/6/2024)    PRAPARE - Transportation     Lack of Transportation (Medical): No     Lack of Transportation (Non-Medical): No   Physical Activity: Not on file   Stress: No Stress Concern Present (4/6/2022)    Received from [facility]    [employer] - Occupational Stress Questionnaire     Feeling of Stress : Not at all   Social Connections: Not on file   Intimate Partner Violence: Not At Risk (4/4/2022)    Received from [facility]    Intimate Partner Violence     Within the last year, have you been afraid of your partner, ex-partner or family member?: 2     Within the last year, have you been humiliated or emotionally abused in other ways by your partner, ex-partner or family member?: 2     Within the last year, have you been kicked, hit, slapped, or otherwise physically hurt by your partner, ex-partner or family member?: 2     Within the last year, have you been raped or forced to have any kind of sexual activity by your partner, ex-partner or family member?: 2   Housing Stability: Unknown (3/29/2022)    Received from [facility]    Housing Stability Vital Sign     Unable to Pay for Housing in the Last Year: No     Number of Places Lived in the Last Year: Not on file     Unstable Housing in the Last Year: No      Medications and Allergies:     Current Outpatient Medications   Medication Sig Dispense Refill    amiodarone  100 mg tablet Take 1 tablet (100 mg total) by mouth daily 30 tablet 5    Calcium Carbonate (CALTRATE 600 PO) Take by mouth AS needed      clotrimazole-betamethasone (LOTRISONE) 1-0.05 % cream Apply topically 2 (two) times a day 45 g 3    dextromethorphan-guaifenesin (MUCINEX DM)  MG per 12 hr tablet Take 1 tablet by mouth if needed for cough AS needed      docusate sodium (COLACE) 100 mg capsule Take 100 mg by mouth if needed for constipation AS needed      Eliquis 2.5 MG Take 1 tablet (2.5 mg total) by mouth 2 (two) times a day 180 tablet 0    esomeprazole (NexIUM) 40 MG capsule TAKE ONE CAPSULE BY MOUTH EVERY MORNING 90 capsule 1    ipratropium (ATROVENT) 0.03 % nasal spray 2 sprays into each nostril 3 (three) times a day 30 mL 5    ipratropium-albuterol (COMBIVENT RESPIMAT) inhaler Inhale 1 puff every 6 (six) hours as needed (wheezing) 4 g 5    levothyroxine 88 mcg tablet Take 1 tablet (88 mcg total) by mouth daily 30 tablet 5    Loratadine 10 MG CAPS Take 10 mg by mouth      olmesartan (BENICAR) 20 mg tablet Take 1 tablet (20 mg total) by mouth daily 30 tablet 5    rosuvastatin (CRESTOR) 10 MG tablet Take 1 tablet (10 mg total) by mouth every evening 30 tablet 5     No current facility-administered medications for this visit.     Allergies   Allergen Reactions    Gabapentin Other (See Comments)     Weakness    Nystatin-Triamcinolone Other (See Comments)     Inflammation      Other Eye Swelling     Seasonal Allergies        Immunizations:     Immunization History   Administered Date(s) Administered    COVID-19 MODERNA VACC 0.5 ML IM 02/09/2021, 03/09/2021, 10/26/2021, 06/07/2022    COVID-19 Moderna Vac BIVALENT 12 Yr+ IM 0.5 ML 10/20/2022    INFLUENZA 09/08/2010, 10/01/2011, 09/27/2012, 09/14/2013, 09/18/2014, 09/30/2014, 09/14/2017, 10/04/2018, 10/08/2020, 09/13/2021, 09/11/2022    Influenza Split High Dose Preservative Free IM 09/14/2017, 10/08/2020, 09/11/2022    Influenza, high dose seasonal 0.7 mL  10/04/2018, 10/18/2019    Influenza, seasonal, injectable 09/08/2010, 10/01/2011, 09/27/2012, 09/14/2013, 09/18/2014, 09/30/2014, 10/05/2016    Pneumococcal Conjugate 13-Valent 09/09/2009, 09/29/2009, 11/12/2014, 02/02/2015    Pneumococcal Polysaccharide PPV23 09/09/2009, 11/12/2014    Zoster 06/01/2015    Zoster Vaccine Recombinant 09/10/2019, 12/10/2019      Health Maintenance:     There are no preventive care reminders to display for this patient.      Topic Date Due    Influenza Vaccine (1) 09/01/2023    COVID-19 Vaccine (6 - 2023-24 season) 09/01/2023      Medicare Screening Tests and Risk Assessments:     Janice is here for her Subsequent Wellness visit. Last Medicare Wellness visit information reviewed, patient interviewed and updates made to the record today.      Health Risk Assessment:   Patient rates overall health as good. Patient feels that their physical health rating is same. Patient is very satisfied with their life. Eyesight was rated as same. Hearing was rated as same. Patient feels that their emotional and mental health rating is same. Patients states they are never, rarely angry. Patient states they are never, rarely unusually tired/fatigued. Pain experienced in the last 7 days has been none. Patient states that she has experienced no weight loss or gain in last 6 months.     Depression Screening:   PHQ-2 Score: 0      Fall Risk Screening:   In the past year, patient has experienced: history of falling in past year    Number of falls: 1  Injured during fall?: No    Feels unsteady when standing or walking?: Yes    Worried about falling?: No      Urinary Incontinence Screening:   Patient has not leaked urine accidently in the last six months.     Home Safety:  Patient has trouble with stairs inside or outside of their home. Patient has working smoke alarms and has working carbon monoxide detector. Home safety hazards include: none.     Nutrition:   Current diet is Regular.     Medications:    Patient is not currently taking any over-the-counter supplements. Patient is able to manage medications.     Activities of Daily Living (ADLs)/Instrumental Activities of Daily Living (IADLs):   Walk and transfer into and out of bed and chair?: Yes  Dress and groom yourself?: Yes    Bathe or shower yourself?: Yes    Feed yourself? Yes  Do your laundry/housekeeping?: Yes  Manage your money, pay your bills and track your expenses?: Yes  Make your own meals?: Yes    Do your own shopping?: Yes    Previous Hospitalizations:   Any hospitalizations or ED visits within the last 12 months?: No      Advance Care Planning:   Living will: Yes    Durable POA for healthcare: Yes    Advanced directive: Yes    Advanced directive counseling given: Yes    ACP document given: Yes    Patient declined ACP directive: No    End of Life Decisions reviewed with patient: Yes    Provider agrees with end of life decisions: Yes      Cognitive Screening:   Provider or family/friend/caregiver concerned regarding cognition?: No    PREVENTIVE SCREENINGS      Cardiovascular Screening:    General: Screening Not Indicated, History Lipid Disorder, Risks and Benefits Discussed and Screening Current    Due for: Lipid Panel      Diabetes Screening:     General: Screening Current and Risks and Benefits Discussed    Due for: Blood Glucose      Colorectal Cancer Screening:     General: Screening Not Indicated and Risks and Benefits Discussed    Due for: Colonoscopy - High Risk      Breast Cancer Screening:     General: Risks and Benefits Discussed    Due for: Mammogram        Cervical Cancer Screening:    General: Screening Not Indicated and Risks and Benefits Discussed      Osteoporosis Screening:    General: Risks and Benefits Discussed and Screening Not Indicated      Abdominal Aortic Aneurysm (AAA) Screening:        General: Risks and Benefits Discussed and Screening Not Indicated      Lung Cancer Screening:     General: Screening Not Indicated, History  "Lung Cancer and Risks and Benefits Discussed      Hepatitis C Screening:    General: Risks and Benefits Discussed and Screening Current    Hep C Screening Accepted: Yes      Screening, Brief Intervention, and Referral to Treatment (SBIRT)    Screening  Typical number of drinks in a day: 1  Typical number of drinks in a week: 7  Interpretation: Low risk drinking behavior.    AUDIT-C Screenin) How often did you have a drink containing alcohol in the past year? monthly or less  3) How often did you have 6 or more drinks on one occasion in the past year? never    Single Item Drug Screening:  How often have you used an illegal drug (including marijuana) or a prescription medication for non-medical reasons in the past year? never    Single Item Drug Screen Score: 0  Interpretation: Negative screen for possible drug use disorder    No results found.     Physical Exam:     /80 (BP Location: Left arm, Patient Position: Sitting, Cuff Size: Adult)   Pulse 92   Temp 97.7 °F (36.5 °C)   Ht 5' 1\" (1.549 m)   Wt 58.9 kg (129 lb 12.8 oz)   SpO2 95%   BMI 24.53 kg/m²     Physical Exam  Vitals reviewed.   Constitutional:       General: She is not in acute distress.     Appearance: She is well-developed. She is not diaphoretic.   HENT:      Head: Normocephalic and atraumatic.      Right Ear: External ear normal.      Left Ear: External ear normal.      Nose: Nose normal.   Eyes:      General: Lids are normal.         Right eye: No discharge.         Left eye: No discharge.      Extraocular Movements: Extraocular movements intact.      Conjunctiva/sclera: Conjunctivae normal.      Pupils: Pupils are equal, round, and reactive to light.   Cardiovascular:      Rate and Rhythm: Normal rate and regular rhythm.      Pulses: Normal pulses.           Dorsalis pedis pulses are 2+ on the right side and 2+ on the left side.        Posterior tibial pulses are 2+ on the right side and 2+ on the left side.      Heart sounds: Normal " heart sounds. No murmur heard.     No friction rub. No gallop.   Pulmonary:      Effort: Pulmonary effort is normal. No respiratory distress.      Breath sounds: Normal breath sounds. No wheezing or rales.   Abdominal:      General: Bowel sounds are normal. There is no distension.      Palpations: Abdomen is soft.      Tenderness: There is no abdominal tenderness. There is no guarding.   Musculoskeletal:         General: Normal range of motion.      Cervical back: Normal range of motion and neck supple.      Right lower leg: No edema.      Left lower leg: No edema.   Lymphadenopathy:      Cervical: No cervical adenopathy.   Skin:     General: Skin is warm and dry.      Capillary Refill: Capillary refill takes less than 2 seconds.      Findings: No erythema or rash.   Neurological:      Mental Status: She is alert and oriented to person, place, and time.      Cranial Nerves: No cranial nerve deficit.   Psychiatric:         Attention and Perception: Attention and perception normal.         Mood and Affect: Mood and affect normal.         Behavior: Behavior normal.         Thought Content: Thought content normal.         Cognition and Memory: Cognition and memory normal.         Judgment: Judgment normal.        Carrillo Pruett, DO

## 2024-03-21 ENCOUNTER — TELEPHONE (OUTPATIENT)
Age: 84
End: 2024-03-21

## 2024-03-21 NOTE — TELEPHONE ENCOUNTER
Pt is looking for closerr care for oncologist/ radiologist closer than [place] that dr Pruett could suggest for her. She would like to look into alt. Since distance is too much

## 2024-03-22 ENCOUNTER — APPOINTMENT (OUTPATIENT)
Dept: LAB | Facility: CLINIC | Age: 84
End: 2024-03-22
Payer: MEDICARE

## 2024-03-22 DIAGNOSIS — C34.90 NON-SMALL CELL LUNG CANCER, UNSPECIFIED LATERALITY (HCC): ICD-10-CM

## 2024-03-22 DIAGNOSIS — R79.89 ELEVATED SERUM CREATININE: ICD-10-CM

## 2024-03-22 LAB
ALBUMIN SERPL BCP-MCNC: 3.7 G/DL (ref 3.5–5)
ALP SERPL-CCNC: 60 U/L (ref 34–104)
ALT SERPL W P-5'-P-CCNC: 16 U/L (ref 7–52)
ANION GAP SERPL CALCULATED.3IONS-SCNC: 12 MMOL/L (ref 4–13)
AST SERPL W P-5'-P-CCNC: 19 U/L (ref 13–39)
BASOPHILS # BLD AUTO: 0.02 THOUSANDS/ÂΜL (ref 0–0.1)
BASOPHILS NFR BLD AUTO: 0 % (ref 0–1)
BILIRUB SERPL-MCNC: 0.71 MG/DL (ref 0.2–1)
BUN SERPL-MCNC: 25 MG/DL (ref 5–25)
CALCIUM SERPL-MCNC: 8.7 MG/DL (ref 8.4–10.2)
CHLORIDE SERPL-SCNC: 102 MMOL/L (ref 96–108)
CO2 SERPL-SCNC: 23 MMOL/L (ref 21–32)
CREAT SERPL-MCNC: 1.51 MG/DL (ref 0.6–1.3)
EOSINOPHIL # BLD AUTO: 0.03 THOUSAND/ÂΜL (ref 0–0.61)
EOSINOPHIL NFR BLD AUTO: 1 % (ref 0–6)
ERYTHROCYTE [DISTWIDTH] IN BLOOD BY AUTOMATED COUNT: 13 % (ref 11.6–15.1)
FERRITIN SERPL-MCNC: 348 NG/ML (ref 11–307)
GFR SERPL CREATININE-BSD FRML MDRD: 31 ML/MIN/1.73SQ M
GLUCOSE P FAST SERPL-MCNC: 86 MG/DL (ref 65–99)
HCT VFR BLD AUTO: 37.2 % (ref 34.8–46.1)
HGB BLD-MCNC: 11.9 G/DL (ref 11.5–15.4)
IMM GRANULOCYTES # BLD AUTO: 0.01 THOUSAND/UL (ref 0–0.2)
IMM GRANULOCYTES NFR BLD AUTO: 0 % (ref 0–2)
IRON SATN MFR SERPL: 39 % (ref 15–50)
IRON SERPL-MCNC: 83 UG/DL (ref 50–212)
LYMPHOCYTES # BLD AUTO: 1.34 THOUSANDS/ÂΜL (ref 0.6–4.47)
LYMPHOCYTES NFR BLD AUTO: 24 % (ref 14–44)
MCH RBC QN AUTO: 32.9 PG (ref 26.8–34.3)
MCHC RBC AUTO-ENTMCNC: 32 G/DL (ref 31.4–37.4)
MCV RBC AUTO: 103 FL (ref 82–98)
MONOCYTES # BLD AUTO: 0.51 THOUSAND/ÂΜL (ref 0.17–1.22)
MONOCYTES NFR BLD AUTO: 9 % (ref 4–12)
NEUTROPHILS # BLD AUTO: 3.75 THOUSANDS/ÂΜL (ref 1.85–7.62)
NEUTS SEG NFR BLD AUTO: 66 % (ref 43–75)
NRBC BLD AUTO-RTO: 0 /100 WBCS
PLATELET # BLD AUTO: 223 THOUSANDS/UL (ref 149–390)
PMV BLD AUTO: 11.2 FL (ref 8.9–12.7)
POTASSIUM SERPL-SCNC: 4.6 MMOL/L (ref 3.5–5.3)
PROT SERPL-MCNC: 6.2 G/DL (ref 6.4–8.4)
RBC # BLD AUTO: 3.62 MILLION/UL (ref 3.81–5.12)
SODIUM SERPL-SCNC: 137 MMOL/L (ref 135–147)
TIBC SERPL-MCNC: 214 UG/DL (ref 250–450)
TSH SERPL DL<=0.05 MIU/L-ACNC: 0.59 UIU/ML (ref 0.45–4.5)
UIBC SERPL-MCNC: 131 UG/DL (ref 155–355)
WBC # BLD AUTO: 5.66 THOUSAND/UL (ref 4.31–10.16)

## 2024-03-22 PROCEDURE — 80053 COMPREHEN METABOLIC PANEL: CPT

## 2024-03-22 PROCEDURE — 83550 IRON BINDING TEST: CPT

## 2024-03-22 PROCEDURE — 82728 ASSAY OF FERRITIN: CPT

## 2024-03-22 PROCEDURE — 36415 COLL VENOUS BLD VENIPUNCTURE: CPT

## 2024-03-22 PROCEDURE — 84443 ASSAY THYROID STIM HORMONE: CPT

## 2024-03-22 PROCEDURE — 85025 COMPLETE CBC W/AUTO DIFF WBC: CPT

## 2024-03-22 PROCEDURE — 83540 ASSAY OF IRON: CPT

## 2024-04-05 ENCOUNTER — TELEPHONE (OUTPATIENT)
Age: 84
End: 2024-04-05

## 2024-04-05 NOTE — TELEPHONE ENCOUNTER
[facility] called as they are faxing a referral and office notes to our [place] office Fax #[phone]. They asked if we can please review the referral and contact patient to schedule a consult in [place]. Please advise once referral is received. Thank you

## 2024-04-11 NOTE — TELEPHONE ENCOUNTER
The referral has been scanned into patients chart as of Yesterday at 12:29pm. The referral has came from [facility]. Please advise

## 2024-04-24 ENCOUNTER — CONSULT (OUTPATIENT)
Dept: PULMONOLOGY | Facility: CLINIC | Age: 84
End: 2024-04-24
Payer: MEDICARE

## 2024-04-24 ENCOUNTER — TELEPHONE (OUTPATIENT)
Age: 84
End: 2024-04-24

## 2024-04-24 VITALS
OXYGEN SATURATION: 99 % | WEIGHT: 124.4 LBS | BODY MASS INDEX: 23.49 KG/M2 | HEART RATE: 82 BPM | HEIGHT: 61 IN | SYSTOLIC BLOOD PRESSURE: 118 MMHG | DIASTOLIC BLOOD PRESSURE: 82 MMHG

## 2024-04-24 DIAGNOSIS — C34.92 NON-SMALL CELL CARCINOMA OF LEFT LUNG (HCC): ICD-10-CM

## 2024-04-24 DIAGNOSIS — J45.909 UNCOMPLICATED ASTHMA, UNSPECIFIED ASTHMA SEVERITY, UNSPECIFIED WHETHER PERSISTENT: ICD-10-CM

## 2024-04-24 DIAGNOSIS — I27.20 PULMONARY HYPERTENSION (HCC): ICD-10-CM

## 2024-04-24 DIAGNOSIS — R06.09 DYSPNEA ON EXERTION: Primary | ICD-10-CM

## 2024-04-24 PROCEDURE — 94618 PULMONARY STRESS TESTING: CPT | Performed by: INTERNAL MEDICINE

## 2024-04-24 PROCEDURE — 99205 OFFICE O/P NEW HI 60 MIN: CPT | Performed by: INTERNAL MEDICINE

## 2024-04-24 RX ORDER — AZELASTINE 1 MG/ML
1 SPRAY, METERED NASAL 2 TIMES DAILY
COMMUNITY
End: 2024-04-24 | Stop reason: ALTCHOICE

## 2024-04-24 RX ORDER — ASPIRIN 81 MG/1
81 TABLET, CHEWABLE ORAL DAILY
COMMUNITY

## 2024-04-24 RX ORDER — SIMETHICONE 125 MG
125 TABLET,CHEWABLE ORAL EVERY 6 HOURS PRN
COMMUNITY

## 2024-04-24 NOTE — ASSESSMENT & PLAN NOTE
Multifactorial likely secondary to a combination of prior left upper lobectomy, radiation therapy with some chronic changes in the lung, hiatal hernia, loss of height with restrictive thoracic physiology, and an element of cardiovascular deconditioning  She also has been noted on prior echocardiogram in 2022 to have significant mitral regurgitation and significantly elevated pulmonary pressures suggesting pulmonary hypertension

## 2024-04-24 NOTE — ASSESSMENT & PLAN NOTE
Patient does not have a substantial smoking history, no emphysema, no chronic bronchitis  Did not respond well to Trelegy  Uses Combivent on average twice daily.  She does notice some benefit from this and may have a component of asthma  Continue present therapy  Check complete pulmonary function testing  Would consider pulmonary rehabilitation for asthma, lung cancer, and component of deconditioning

## 2024-04-24 NOTE — TELEPHONE ENCOUNTER
Patient called as she was seen in the office today and was ordered an 'Echo w/contrast if indicated'. She does have this scheduled for 6/26/24. She has been advised by her physician in the past that she should not be getting anymore tests with contrast due to her kidneys. She wanted to let  know and see what he advises. Please advise

## 2024-04-24 NOTE — PROGRESS NOTES
Consultation - Pulmonary Medicine   Janice Ayala 83 y.o. female MRN: [id number]    Physician Requesting Consult: Dr. Pruett, Dr Curry  Reason for Consult: Shortness of breath    Dyspnea on exertion  Multifactorial likely secondary to a combination of prior left upper lobectomy, radiation therapy with some chronic changes in the lung, hiatal hernia, loss of height with restrictive thoracic physiology, and an element of cardiovascular deconditioning  She also has been noted on prior echocardiogram in 2022 to have significant mitral regurgitation and significantly elevated pulmonary pressures suggesting pulmonary hypertension    Asthma  Patient does not have a substantial smoking history, no emphysema, no chronic bronchitis  Did not respond well to Trelegy  Uses Combivent on average twice daily.  She does notice some benefit from this and may have a component of asthma  Continue present therapy  Check complete pulmonary function testing  Would consider pulmonary rehabilitation for asthma, lung cancer, and component of deconditioning    Non-small cell carcinoma of left lung (HCC)  Followed in [place] by Dr. Curry  Does appear to have probable right upper  lobe recurrence by PET scan and plan for further radiation therapy  Has acute and chronic radiation changes on the left side as well.  This is in an area of a previously identified 9 mm lung nodule.  Will defer imaging follow-up to her oncologist and radiation oncologist    Pulmonary hypertension (HCC)  Previously saw cardiology in [place]  Recently saw Dr. Black but did not have any further workup recommended at that time  Previous echo suggested significant pulmonary hypertension with estimated RVSP of 61 mmHg and there is associated mild mitral regurgitation with normal ejection fraction  Recommended updated cardiac echo to reevaluate.  Does not have signs of decompensated right heart failure  Due for cardiology follow-up in May      Return in about 3 months  (around 7/24/2024).      ______________________________________________________________________    HPI:    Janice Ayala is a 83 y.o. female who presents for evaluation of shortness of breath at the request of her oncologist, Dr. Curry and her primary care physician Dr. Pruett.  She is accompanied by her daughter who has provided historical elements and clarification.  She has history of non-small cell lung carcinoma.  She has had left upper lobe resection.  She has had recurrence and radiation therapy.  More recently she had a PET scan which suggest possible new lesion on the right side with increased metabolic uptake for which she is planned additional radiation treatment.  She is not currently on chemotherapy or targeted antibody therapy.    She does admit to shortness of breath.  This has become progressive and she is limiting her activity significantly.  She gets fatigued and shaky.  She feels weak.  She does not get chest pain.  She has followed up with a cardiologist in [place] and more recently transition to Dr. Black.  She has chronic atrial fibrillation and is on amiodarone and anticoagulation.  Previous echo suggested pulmonary hypertension although she does not have overt signs or symptoms of pulmonary hypertension        Review of Systems:  Aside from what is mentioned in the HPI, the review of systems otherwise negative.    Current Medications:    Current Outpatient Medications:     aspirin 81 mg chewable tablet, Chew 81 mg daily, Disp: , Rfl:     Calcium Carbonate (CALTRATE 600 PO), Take by mouth AS needed, Disp: , Rfl:     docusate sodium (COLACE) 100 mg capsule, Take 100 mg by mouth if needed for constipation AS needed, Disp: , Rfl:     Eliquis 2.5 MG, Take 1 tablet (2.5 mg total) by mouth 2 (two) times a day, Disp: 180 tablet, Rfl: 0    esomeprazole (NexIUM) 40 MG capsule, TAKE ONE CAPSULE BY MOUTH EVERY MORNING, Disp: 90 capsule, Rfl: 1    ipratropium-albuterol (COMBIVENT RESPIMAT) inhaler,  "Inhale 1 puff every 6 (six) hours as needed (wheezing), Disp: 4 g, Rfl: 5    levothyroxine 88 mcg tablet, Take 1 tablet (88 mcg total) by mouth daily, Disp: 30 tablet, Rfl: 5    Loratadine 10 MG CAPS, Take 10 mg by mouth, Disp: , Rfl:     olmesartan (BENICAR) 20 mg tablet, Take 1 tablet (20 mg total) by mouth daily, Disp: 30 tablet, Rfl: 5    rosuvastatin (CRESTOR) 10 MG tablet, Take 1 tablet (10 mg total) by mouth every evening, Disp: 30 tablet, Rfl: 5    simethicone (MYLICON) 125 MG chewable tablet, Chew 125 mg every 6 (six) hours as needed for flatulence, Disp: , Rfl:     amiodarone 100 mg tablet, Take 1 tablet (100 mg total) by mouth daily, Disp: 30 tablet, Rfl: 5    Historical Information   Past Medical History:   Diagnosis Date    Afib (HCC)     Chronic kidney disease     Essential hypertension     Hyperlipidemia     Osteoporosis     Thyroid disease      Past Surgical History:   Procedure Laterality Date    CAROTID ENDARTARECTOMY Left     CT NEEDLE BIOPSY LUNG  10/5/2020    HIP SURGERY Bilateral     LUNG SURGERY Left     US GUIDED THYROID BIOPSY  10/28/2020     Social History   Social History     Tobacco Use   Smoking Status Former    Current packs/day: 0.00    Average packs/day: 0.5 packs/day for 6.0 years (3.0 ttl pk-yrs)    Types: Cigarettes    Start date:     Quit date:     Years since quittin.3   Smokeless Tobacco Never       Occupational history:  Retired nurse    Family History:   Family History   Problem Relation Age of Onset    Heart disease Mother     Heart disease Father     Kidney disease Father     Asthma Brother          PhysicalExamination:  Vitals:   /82 (BP Location: Left arm, Patient Position: Sitting, Cuff Size: Standard)   Pulse 82   Ht 5' 1\" (1.549 m)   Wt 56.4 kg (124 lb 6.4 oz)   SpO2 99%   BMI 23.51 kg/m²     Gen:  Comfortable on room air.  No conversational dyspnea  HEENT:  Conjugate gaze.  sclerae anicteric.  Oropharynx moist  Neck: Trachea is midline. No JVD. " No adenopathy  Chest: Excursion is symmetric with slightly limited expansion.  Lungs are distant but otherwise clear.  No wheezes or crackles  Cardiac: Regular. no murmur is appreciated on auscultation today  Abdomen:  benign  Extremities: No edema  Neuro:  Normal speech and mentation      Diagnostic Data:  Labs:  I personally reviewed the most recent laboratory data pertinent to today's visit    Lab Results   Component Value Date    WBC 5.66 03/22/2024    HGB 11.9 03/22/2024    HCT 37.2 03/22/2024     (H) 03/22/2024     03/22/2024     Lab Results   Component Value Date    CALCIUM 8.7 03/22/2024    K 4.6 03/22/2024    CO2 23 03/22/2024     03/22/2024    BUN 25 03/22/2024    CREATININE 1.51 (H) 03/22/2024         PFT results:  No prior PFTs for review    Imaging:  I personally reviewed the images on the PAC system pertinent to today's visit  Prior PET scan from 3/18/2024 and prior CT scan from 2/29/2024.  These were performed at [place].  There is radiation change in the left lower lobe at the previously irradiated 9 mm nodule there is a right upper lobe pulmonary nodule that is metabolically active and planned for additional radiation therapy as well.  There are some mild changes in the right middle lobe that have a slightly tree-in-bud appearance.  Large hiatal hernia with air-fluid level in the esophagus is noted    Outside echocardiogram from 3/31/2022 at [place] showed ejection fraction 60 to 65% with normal right ventricular size and function.  Moderate to severe tricuspid regurgitation with estimated right ventricular systolic pressure of 61 mmHg.  Moderate mitral valve thickening with mild regurgitation      Hawk Londono MD

## 2024-04-24 NOTE — ASSESSMENT & PLAN NOTE
Followed in [place] by Dr. Curry  Does appear to have probable right upper  lobe recurrence by PET scan and plan for further radiation therapy  Has acute and chronic radiation changes on the left side as well.  This is in an area of a previously identified 9 mm lung nodule.  Will defer imaging follow-up to her oncologist and radiation oncologist

## 2024-04-24 NOTE — ASSESSMENT & PLAN NOTE
Previously saw cardiology in [place]  Recently saw Dr. Black but did not have any further workup recommended at that time  Previous echo suggested significant pulmonary hypertension with estimated RVSP of 61 mmHg and there is associated mild mitral regurgitation with normal ejection fraction  Recommended updated cardiac echo to reevaluate.  Does not have signs of decompensated right heart failure  Due for cardiology follow-up in May

## 2024-04-29 ENCOUNTER — LAB REQUISITION (OUTPATIENT)
Dept: LAB | Facility: HOSPITAL | Age: 84
End: 2024-04-29
Payer: MEDICARE

## 2024-04-29 DIAGNOSIS — C34.92 MALIGNANT NEOPLASM OF UNSPECIFIED PART OF LEFT BRONCHUS OR LUNG (HCC): ICD-10-CM

## 2024-04-29 DIAGNOSIS — N18.31 CHRONIC KIDNEY DISEASE, STAGE 3A (HCC): ICD-10-CM

## 2024-04-29 PROCEDURE — 88321 CONSLTJ&REPRT SLD PREP ELSWR: CPT | Performed by: PATHOLOGY

## 2024-05-05 DIAGNOSIS — I10 PRIMARY HYPERTENSION: ICD-10-CM

## 2024-05-05 DIAGNOSIS — I48.0 PAROXYSMAL ATRIAL FIBRILLATION (HCC): ICD-10-CM

## 2024-05-06 DIAGNOSIS — E78.5 HYPERLIPIDEMIA, UNSPECIFIED HYPERLIPIDEMIA TYPE: ICD-10-CM

## 2024-05-06 RX ORDER — OLMESARTAN MEDOXOMIL 20 MG/1
20 TABLET ORAL DAILY
Qty: 90 TABLET | Refills: 1 | Status: SHIPPED | OUTPATIENT
Start: 2024-05-06 | End: 2024-11-02

## 2024-05-07 RX ORDER — ROSUVASTATIN CALCIUM 10 MG/1
10 TABLET, COATED ORAL EVERY EVENING
Qty: 90 TABLET | Refills: 1 | Status: SHIPPED | OUTPATIENT
Start: 2024-05-07 | End: 2024-06-06

## 2024-05-10 ENCOUNTER — RADIATION ONCOLOGY CONSULT (OUTPATIENT)
Dept: RADIATION ONCOLOGY | Facility: CLINIC | Age: 84
End: 2024-05-10
Attending: INTERNAL MEDICINE
Payer: MEDICARE

## 2024-05-10 VITALS
BODY MASS INDEX: 23.41 KG/M2 | HEIGHT: 61 IN | TEMPERATURE: 97.2 F | OXYGEN SATURATION: 97 % | RESPIRATION RATE: 16 BRPM | SYSTOLIC BLOOD PRESSURE: 159 MMHG | DIASTOLIC BLOOD PRESSURE: 83 MMHG | WEIGHT: 124 LBS | HEART RATE: 58 BPM

## 2024-05-10 DIAGNOSIS — C34.31 MALIGNANT NEOPLASM OF LOWER LOBE OF RIGHT LUNG (HCC): Primary | ICD-10-CM

## 2024-05-10 DIAGNOSIS — Z85.118 HISTORY OF PRIMARY NON-SMALL CELL CARCINOMA OF LEFT LUNG: ICD-10-CM

## 2024-05-10 PROBLEM — C34.91 MALIGNANT NEOPLASM OF RIGHT LUNG (HCC): Status: ACTIVE | Noted: 2023-01-10

## 2024-05-10 PROCEDURE — 99211 OFF/OP EST MAY X REQ PHY/QHP: CPT | Performed by: INTERNAL MEDICINE

## 2024-05-10 PROCEDURE — 99215 OFFICE O/P EST HI 40 MIN: CPT | Performed by: INTERNAL MEDICINE

## 2024-05-10 NOTE — PROGRESS NOTES
Janice Ayala 1940 is a 83 y.o. femaleWith h/o non-small cell carcinoma of left lung.  Presents in consultation for discussion of SBRT.  Referred by Dr. Pinzon ([facility]).  Additional medical problems include a-fib, pulmonary hypertension, Stage 3a CKD.  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     Originally diagnosed with adenocarcinoma of SHAHEEN in 2020.  Underwent left upper lobe lobectomy 11/18/2020.  She completed 3 of 4 planned cycles of adjuvant Carboplatin + Pemetrexed 1/26/21.  CT CAP on 5/6/22 showed enlarging RML nodule, adjacent to heart border.  Nodule was presumed to be malignant.  She completed Empiric XRT to RML nodule 7/2022. Most recent brain MRI completed 8/2023 showed no evidence of disease    Surveillance Chest CT scans shows possible recurrent left nodule and new right lung nodule    2/29/24  CT  abdomen pelvis ([facility])  Impression  No definite findings to suggest metastatic disease in the abdomen and pelvis given noncontrast technique.    2/29/24  CT Chest wo contrast ([facility])  Impression  1. New masslike consolidation with air bronchograms in the LEFT lower lobe, at the location of the previously described irregular 9 mm nodule. These interval changes likely represent recent radiotherapy. An underlying malignant process is difficult to entirely exclude.  2. A 1 cm irregular pulmonary nodule in the RIGHT upper lobe is suspicious for a primary bronchogenic malignancy.  3. Evolving groundglass opacities and linear densities within the RIGHT middle lobe and adjacent RIGHT lower lobe are nonspecific. An infectious/inflammatory process and posttreatment changes are included in the differential. A malignant process is difficult to entirely exclude.  4. No significant interval change to the aneurysmal dilatation of the thoracic aorta.  5. Questionable nodular  density in the upper RIGHT breast. Correlation with mammographic imaging is recommended.  6. Additional chronic changes as described.  Thoracic aortic dilation is noted. Thoracic aortic aneurysm navigators to evaluate and follow up with the referring provider.     3/18/24  PET CT ([facility])  Impression  1. Mildly increased glucose metabolism of the RIGHT lower lobe pulmonary nodule. The lesion remains concerning for a new focus of carcinoma.  2. Increased glucose metabolism of the LEFT lung consolidation. This could be infectious, inflammatory or neoplastic in etiology, and short-term follow-up is recommended.  3. Small area of tiny nodular infiltrates with increased glucose metabolism within the RIGHT upper lobe thought to likely be infectious or inflammatory in nature.    24  Dr. Curry   Recent scans reviewed.  Will refer to radiation oncology for consideration of Emperic SBRT to RLL nodule.  Will repeat CT scans 24  [facility] ([facility])  Has substantial smoking history.  Will check PFT's and consider pulmonary rehab referral.    Upcomin24  Complete PFT's  24  Dr. Curry  24   Dr. Londono      Oncology History Overview Note   With h/o non-small cell carcinoma of left lung.  Presents in consultation for discussion of SBRT.  Referred by Dr. Pinzon ([facility]).  Additional medical problems include a-fib, pulmonary hypertension, Stage 3a CKD.  Also has h/o papillary carcinoma of right thyroid, surgery completed 21, and history of left occipital stroke with residual vision problems.     Originally diagnosed with adenocarcinoma of SHAHEEN in .  Underwent left upper lobe lobectomy 2020.  She completed 3 of 4 planned cycles of adjuvant Carboplatin + Pemetrexed 21.  CT CAP on 22 showed enlarging RML nodule, adjacent to heart border.  Nodule was presumed to be malignant.  She completed Empiric XRT to RML nodule 2022. Most recent brain MRI completed  8/2023 showed no evidence of disease    Surveillance Chest CT scans shows possible recurrent left nodule and new right lung nodule    2/29/24  CT  abdomen pelvis ([facility])  Impression  No definite findings to suggest metastatic disease in the abdomen and pelvis given noncontrast technique.    2/29/24  CT Chest wo contrast ([facility])  Impression  1. New masslike consolidation with air bronchograms in the LEFT lower lobe, at the location of the previously described irregular 9 mm nodule. These interval changes likely represent recent radiotherapy. An underlying malignant process is difficult to entirely exclude.  2. A 1 cm irregular pulmonary nodule in the RIGHT upper lobe is suspicious for a primary bronchogenic malignancy.  3. Evolving groundglass opacities and linear densities within the RIGHT middle lobe and adjacent RIGHT lower lobe are nonspecific. An infectious/inflammatory process and posttreatment changes are included in the differential. A malignant process is difficult to entirely exclude.  4. No significant interval change to the aneurysmal dilatation of the thoracic aorta.  5. Questionable nodular density in the upper RIGHT breast. Correlation with mammographic imaging is recommended.  6. Additional chronic changes as described.  Thoracic aortic dilation is noted. Thoracic aortic aneurysm navigators to evaluate and follow up with the referring provider.     3/18/24  PET CT ([facility])  Impression  1. Mildly increased glucose metabolism of the RIGHT lower lobe pulmonary nodule. The lesion remains concerning for a new focus of carcinoma.  2. Increased glucose metabolism of the LEFT lung consolidation. This could be infectious, inflammatory or neoplastic in etiology, and short-term follow-up is recommended.  3. Small area of tiny nodular infiltrates with increased glucose metabolism within the RIGHT upper lobe thought to likely be infectious or inflammatory in  nature.    24  Dr. Curry   Recent scans reviewed.  Will refer to radiation oncology for consideration of Emperic SBRT to RLL nodule.  Will repeat CT scans 24  [facility] ([facility])  Has substantial smoking history.  Will check PFT's and consider pulmonary rehab referral.    Upcomin24  Complete PFT's  24  Dr. Curry  24   Dr. Londono       Non-small cell carcinoma of left lung (HCC)   1/10/2023 Initial Diagnosis    Non-small cell carcinoma of left lung (HCC)         Review of Systems:  Review of Systems   Constitutional:  Negative for appetite change and fatigue.   HENT:  Positive for congestion.    Eyes:         Wears glasses   Respiratory:  Positive for shortness of breath (With activity).         H/o os asthma    Cardiovascular: Negative.    Gastrointestinal: Negative.    Endocrine: Negative.    Genitourinary: Negative.    Musculoskeletal: Negative.    Skin: Negative.    Allergic/Immunologic: Positive for environmental allergies (seasonal).   Neurological: Negative.         Uses a walker    Hematological:  Bruises/bleeds easily.   Psychiatric/Behavioral:  Negative for sleep disturbance.        Clinical Trial: no      Pregnancy test needed:  no        PFT scheduled for 24     Prior Radiation ,      Teaching NIH,SIM,side effects     MST completed     Implantable Devices (Port, Pacemaker, pain stimulator) None     Hip Replacement None       [unfilled]  Health Maintenance   Topic Date Due    COVID-19 Vaccine ( season) 2023    Influenza Vaccine (Season Ended) 2024    Fall Risk  2025    Urinary Incontinence Screening  2025    Medicare Annual Wellness Visit (AWV)  2025    BMI: Adult  2025    Depression Screening  05/10/2025    Osteoporosis Screening  Completed    Zoster Vaccine  Completed    Pneumococcal Vaccine: 65+ Years  Completed    HIB Vaccine  Aged Out    IPV Vaccine  Aged Out    Hepatitis A Vaccine  Aged Out     Meningococcal ACWY Vaccine  Aged Out    HPV Vaccine  Aged Out     Past Medical History:   Diagnosis Date    Afib (HCC)     Chronic kidney disease     Essential hypertension     Hyperlipidemia     Osteoporosis     Thyroid disease      Past Surgical History:   Procedure Laterality Date    CAROTID ENDARTARECTOMY Left     CT NEEDLE BIOPSY LUNG  10/5/2020    HIP SURGERY Bilateral     LUNG SURGERY Left     US GUIDED THYROID BIOPSY  10/28/2020     Family History   Problem Relation Age of Onset    Heart disease Mother     Heart disease Father     Kidney disease Father     Asthma Brother      Social History     Tobacco Use    Smoking status: Former     Current packs/day: 0.00     Average packs/day: 0.5 packs/day for 6.0 years (3.0 ttl pk-yrs)     Types: Cigarettes     Start date:      Quit date:      Years since quittin.3    Smokeless tobacco: Never   Vaping Use    Vaping status: Never Used   Substance Use Topics    Alcohol use: Yes     Comment: rarely    Drug use: Never        Current Outpatient Medications:     docusate sodium (COLACE) 100 mg capsule, Take 100 mg by mouth if needed for constipation AS needed, Disp: , Rfl:     Eliquis 2.5 MG, Take 1 tablet (2.5 mg total) by mouth 2 (two) times a day, Disp: 180 tablet, Rfl: 0    esomeprazole (NexIUM) 40 MG capsule, TAKE ONE CAPSULE BY MOUTH EVERY MORNING, Disp: 90 capsule, Rfl: 1    ipratropium-albuterol (COMBIVENT RESPIMAT) inhaler, Inhale 1 puff every 6 (six) hours as needed (wheezing), Disp: 4 g, Rfl: 5    levothyroxine 88 mcg tablet, Take 1 tablet (88 mcg total) by mouth daily, Disp: 30 tablet, Rfl: 5    olmesartan (BENICAR) 20 mg tablet, Take 1 tablet (20 mg total) by mouth daily, Disp: 90 tablet, Rfl: 1    rosuvastatin (CRESTOR) 10 MG tablet, Take 1 tablet (10 mg total) by mouth every evening, Disp: 90 tablet, Rfl: 1    amiodarone 100 mg tablet, Take 1 tablet (100 mg total) by mouth daily, Disp: 30 tablet, Rfl: 5    aspirin 81 mg chewable tablet, Chew  "81 mg daily, Disp: , Rfl:     Calcium Carbonate (CALTRATE 600 PO), Take by mouth AS needed, Disp: , Rfl:     Loratadine 10 MG CAPS, Take 10 mg by mouth (Patient not taking: Reported on 5/10/2024), Disp: , Rfl:     simethicone (MYLICON) 125 MG chewable tablet, Chew 125 mg every 6 (six) hours as needed for flatulence (Patient not taking: Reported on 5/10/2024), Disp: , Rfl:   Allergies   Allergen Reactions    Gabapentin Other (See Comments)     Weakness    Nystatin-Triamcinolone Other (See Comments)     Inflammation      Other Eye Swelling     Seasonal Allergies        Vitals:    05/10/24 1002   BP: 159/83   BP Location: Left arm   Patient Position: Sitting   Pulse: 58   Resp: 16   Temp: (!) 97.2 °F (36.2 °C)   SpO2: 97%   Weight: 56.2 kg (124 lb)   Height: 5' 1\" (1.549 m)     Pain Score: 0-No pain  "

## 2024-05-10 NOTE — PROGRESS NOTES
Consultation - Radiation Oncology      Patient Name: Janice Ayala MRN:[id number] : 1940  Encounter: [id number]  Referring Provider: Sanford Curry MD     Cancer Staging   Malignant neoplasm of right lung (HCC)  Staging form: Lung, AJCC 8th Edition  - Clinical stage from 3/18/2024: Stage IA1 (cT1a, cN0, cM0) - Signed by Daxa Dodson MD on 5/10/2024    ASSESSMENT & PLAN  Janice Ayala is an 83 y.o. 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  She later underwent 2 courses of empiric SBRT, the first in 2022 to the right lung (60 Gy in 8 fx) and another in 2023 to the left lung (50Gy in fx), both at [facility] Radiation On[facility].  She had recent imaging including a PET/CT on 3/18/2024 which showed mildly increased avidity in the right lung, concerning for new focus of carcinoma.  There were infectious/inflammatory changes noted in the left lung and right upper lobe.  Given that she recently moved to [place], radiation referral was made to consider empiric SBRT to the right lower lobe.    I reviewed prior records and external PET/CT findings with the patient.  Treatment options for suspected early stage lung malignancy include resection, percutaneous ablation, and stereotactic ablative radiotherapy (SBRT). She has been advised against biopsy due to location and size of tumor. I explained that there is always a risk of treating a nonmalignant lesion when offering empiric radiation therapy without a diagnostic biopsy.  However she understands that the benefit of treating a potentially malignant lesion outweighs the risks of not treating a potentially malignant lesion. She has undergone empiric SBRT previously.    Treatment would involve 50 Gy in 5 fractions pending summation of prior radiation fields. We discussed the potential acute and late side effects, which include, but are not limited to fatigue, acute skin reaction, chest wall pain, rib  fracture, radiation esophagitis/stricture (especially relevant due to her large hiatal hernia), radiation pneumonitis/fibrosis potentially requiring steroids, oxygen or hospitlization, injury to the heart, pericarditis, pericardial effusion, increased risk of cardiovascular disease, injury to nerves/spinal cord, and increased risk of secondary malignancy.  We reviewed that the risk of side effects is higher in the context of prior radiation and any prior overlap, including, but not limited to, severe fibrosis, neurologic injury, hemorrhage, fistulization, stricture. We also discussed that, even with appropriate therapy, there is still a risk of progression, recurrence, or distant failure.    I reviewed that she can undergo SBRT here at [facility] after obtaining prior DICOM-RT files.  We also discussed the option of returning to [facility] Radiation Oncology given the short course of treatment, family support in transportation, and relative ease of repeat treatment where her prior RT has been given. She reports she has been thinking about this already. She had a good experience with Dr. Sandoval. She and her daughter believe this to be the most logical approach and will be reaching out to their [place] team. I emphasized that I remain available to offer RT here as well.    Mrs. Ayala and her daughter-in-law were very appreciative of our visit.    Thank you for the opportunity to participate in the care of this patient.    Daxa Dodson MD  Department of Radiation Oncology  [facility]    No orders of the defined types were placed in this encounter.    1. Malignant neoplasm of lower lobe of right lung (HCC)        2. History of primary non-small cell carcinoma of left lung            Total Time Spent  55 minutes spent reviewing EMR in preparation for visit, with the patient, coordination with other providers, and documentation.    CHIEF COMPLAINT  Chief Complaint   Patient presents  with   • Consult     History of Present Illness  Janice Ayala 1940 is a 83 y.o. female with h/o non-small cell carcinoma of left lung.  Presents in consultation for discussion of SBRT.  Referred by Dr. Pinzon ([facility]).  Additional medical problems include a-fib, pulmonary hypertension, Stage 3a CKD.  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      Originally diagnosed with adenocarcinoma of SHAHEEN in 2020.  Underwent left upper lobe lobectomy 11/18/2020.  She completed 3 of 4 planned cycles of adjuvant Carboplatin + Pemetrexed 1/26/21.  CT CAP on 5/6/22 showed enlarging RML nodule, adjacent to heart border.  Nodule was presumed to be malignant.  She completed Empiric XRT to RML nodule 7/2022. Most recent brain MRI completed 8/2023 showed no evidence of disease     Surveillance Chest CT scans shows possible recurrent left nodule and new right lung nodule     2/29/24  CT  abdomen pelvis ([facility])  Impression  No definite findings to suggest metastatic disease in the abdomen and pelvis given noncontrast technique.     2/29/24  CT Chest wo contrast ([facility])  Impression  1. New masslike consolidation with air bronchograms in the LEFT lower lobe, at the location of the previously described irregular 9 mm nodule. These interval changes likely represent recent radiotherapy. An underlying malignant process is difficult to entirely exclude.  2. A 1 cm irregular pulmonary nodule in the RIGHT upper lobe is suspicious for a primary bronchogenic malignancy.  3. Evolving groundglass opacities and linear densities within the RIGHT middle lobe and adjacent RIGHT lower lobe are nonspecific. An infectious/inflammatory process and posttreatment changes are included in the differential. A malignant process is difficult to entirely exclude.  4. No significant interval change to the aneurysmal dilatation  of the thoracic aorta.  5. Questionable nodular density in the upper RIGHT breast. Correlation with mammographic imaging is recommended.  6. Additional chronic changes as described.  Thoracic aortic dilation is noted. Thoracic aortic aneurysm navigators to evaluate and follow up with the referring provider.      3/18/24  PET CT ([facility])  Impression  1. Mildly increased glucose metabolism of the RIGHT lower lobe pulmonary nodule. The lesion remains concerning for a new focus of carcinoma.  2. Increased glucose metabolism of the LEFT lung consolidation. This could be infectious, inflammatory or neoplastic in etiology, and short-term follow-up is recommended.  3. Small area of tiny nodular infiltrates with increased glucose metabolism within the RIGHT upper lobe thought to likely be infectious or inflammatory in nature.     24  Dr. Curry   Recent scans reviewed.  Will refer to radiation oncology for consideration of Emperic SBRT to RLL nodule.  Will repeat CT scans 24  [facility] ([facility])  Has substantial smoking history.  Will check PFT's and consider pulmonary rehab referral.     Upcomin24  Complete PFT's  24  Dr. Curry  24   Dr. Londono    Today, the patient feels well overall.  She presents with her daughter-in-law.  She previously lived near [place], and now lives in [place].  She reports tolerating her 2 prior courses of radiation quite well without significant residual toxicity.    Oncology History   Malignant neoplasm of right lung (HCC)   2022 - 2022 Radiation        [facility]     1/10/2023 Initial Diagnosis    Non-small cell carcinoma of left lung (HCC)     2023 - 2023 Radiation      [facility]     3/18/2024 -  Cancer Staged    Staging form: Lung, AJCC 8th Edition  - Clinical stage from 3/18/2024: Stage IA1 (cT1a, cN0, cM0) - Signed by Daxa Dodson MD on 5/10/2024         Historical Information   Past Medical  History:   Diagnosis Date   • Afib (HCC)    • Chronic kidney disease    • Essential hypertension    • Hyperlipidemia    • Osteoporosis    • Thyroid disease      Past Surgical History:   Procedure Laterality Date   • CAROTID ENDARTARECTOMY Left    • CT NEEDLE BIOPSY LUNG  10/5/2020   • HIP SURGERY Bilateral    • LUNG SURGERY Left    • US GUIDED THYROID BIOPSY  10/28/2020     Family History   Problem Relation Age of Onset   • Heart disease Mother    • Heart disease Father    • Kidney disease Father    • Asthma Brother      Social History   Social History     Substance and Sexual Activity   Alcohol Use Yes    Comment: rarely     Social History     Substance and Sexual Activity   Drug Use Never     Social History     Tobacco Use   Smoking Status Former   • Current packs/day: 0.00   • Average packs/day: 0.5 packs/day for 6.0 years (3.0 ttl pk-yrs)   • Types: Cigarettes   • Start date:    • Quit date:    • Years since quittin.3   Smokeless Tobacco Never     Meds/Allergies     Current Outpatient Medications:   •  docusate sodium (COLACE) 100 mg capsule, Take 100 mg by mouth if needed for constipation AS needed, Disp: , Rfl:   •  Eliquis 2.5 MG, Take 1 tablet (2.5 mg total) by mouth 2 (two) times a day, Disp: 180 tablet, Rfl: 0  •  esomeprazole (NexIUM) 40 MG capsule, TAKE ONE CAPSULE BY MOUTH EVERY MORNING, Disp: 90 capsule, Rfl: 1  •  ipratropium-albuterol (COMBIVENT RESPIMAT) inhaler, Inhale 1 puff every 6 (six) hours as needed (wheezing), Disp: 4 g, Rfl: 5  •  levothyroxine 88 mcg tablet, Take 1 tablet (88 mcg total) by mouth daily, Disp: 30 tablet, Rfl: 5  •  olmesartan (BENICAR) 20 mg tablet, Take 1 tablet (20 mg total) by mouth daily, Disp: 90 tablet, Rfl: 1  •  rosuvastatin (CRESTOR) 10 MG tablet, Take 1 tablet (10 mg total) by mouth every evening, Disp: 90 tablet, Rfl: 1  •  amiodarone 100 mg tablet, Take 1 tablet (100 mg total) by mouth daily, Disp: 30 tablet, Rfl: 5  •  aspirin 81 mg chewable tablet,  "Chew 81 mg daily, Disp: , Rfl:   •  Calcium Carbonate (CALTRATE 600 PO), Take by mouth AS needed, Disp: , Rfl:   •  Loratadine 10 MG CAPS, Take 10 mg by mouth (Patient not taking: Reported on 5/10/2024), Disp: , Rfl:   •  simethicone (MYLICON) 125 MG chewable tablet, Chew 125 mg every 6 (six) hours as needed for flatulence (Patient not taking: Reported on 5/10/2024), Disp: , Rfl:   Allergies   Allergen Reactions   • Gabapentin Other (See Comments)     Weakness   • Nystatin-Triamcinolone Other (See Comments)     Inflammation     • Other Eye Swelling     Seasonal Allergies         Pathology:  See above.    Review of Systems  Refer to nursing note    OBJECTIVE:   /83 (BP Location: Left arm, Patient Position: Sitting)   Pulse 58   Temp (!) 97.2 °F (36.2 °C)   Resp 16   Ht 5' 1\" (1.549 m)   Wt 56.2 kg (124 lb)   SpO2 97%   BMI 23.43 kg/m²   Pain Assessment:  0  Performance Status: ECO    Physical Exam  General Appearance:  Alert, cooperative, no distress, appears stated age  Cardiovascular:  Extremities warm and well perfused  Lungs: Respirations unlabored, no cyanosis, able to speak in complete sentences without dyspnea.  Skin: No generalized rash or dermatitis  Neurologic: ANOx3, speech and cognition intact.    Portions of the record may have been created with voice recognition software.  Occasional wrong word or \"sound a like\" substitutions may have occurred due to the inherent limitations of voice recognition software.  Read the chart carefully and recognize, using context, where substitutions have occurred.  "

## 2024-05-10 NOTE — LETTER
May 10, 2024     Sanford Curry MD  Hematology And Medical Oncology  [address] [facility] [address]    Patient: Janice Ayala   YOB: 1940   Date of Visit: 5/10/2024       Dear Dr. Curry:    Thank you very much for referring Janice Ayala to me for evaluation. Below are my notes for this consultation.    If you have questions, please do not hesitate to call me.         Sincerely,        Daxa Dodson MD        CC: Jonny Sandoval MD Bhartesh A Shah, MD  5/10/2024 12:58 PM  Sign when Signing Visit  Consultation - Radiation Oncology      Patient Name: Janice Ayala MRN:[id number] : 1940  Encounter: [id number]  Referring Provider: Sanford Curry MD     Cancer Staging   Malignant neoplasm of right lung (HCC)  Staging form: Lung, AJCC 8th Edition  - Clinical stage from 3/18/2024: Stage IA1 (cT1a, cN0, cM0) - Signed by Daxa Dodson MD on 5/10/2024    ASSESSMENT & PLAN  Janice Ayala is an 83 y.o. 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  She later underwent 2 courses of empiric SBRT, the first in 2022 to the right lung (60 Gy in 8 fx) and another in 2023 to the left lung (50Gy in fx), both at [facility] Radiation Oncology.  She had recent imaging including a PET/CT on 3/18/2024 which showed mildly increased avidity in the right lung, concerning for new focus of carcinoma.  There were infectious/inflammatory changes noted in the left lung and right upper lobe.  Given that she recently moved to [place], radiation referral was made to consider empiric SBRT to the right lower lobe.    I reviewed prior records and external PET/CT findings with the patient.  Treatment options for suspected early stage lung malignancy include resection, percutaneous ablation, and stereotactic ablative radiotherapy (SBRT). She has been advised against biopsy due to location and size of tumor. I explained that there is always a risk of  treating a nonmalignant lesion when offering empiric radiation therapy without a diagnostic biopsy.  However she understands that the benefit of treating a potentially malignant lesion outweighs the risks of not treating a potentially malignant lesion. She has undergone empiric SBRT previously.    Treatment would involve 50 Gy in 5 fractions pending summation of prior radiation fields. We discussed the potential acute and late side effects, which include, but are not limited to fatigue, acute skin reaction, chest wall pain, rib fracture, radiation esophagitis/stricture (especially relevant due to her large hiatal hernia), radiation pneumonitis/fibrosis potentially requiring steroids, oxygen or hospitlization, injury to the heart, pericarditis, pericardial effusion, increased risk of cardiovascular disease, injury to nerves/spinal cord, and increased risk of secondary malignancy.  We reviewed that the risk of side effects is higher in the context of prior radiation and any prior overlap, including, but not limited to, severe fibrosis, neurologic injury, hemorrhage, fistulization, stricture. We also discussed that, even with appropriate therapy, there is still a risk of progression, recurrence, or distant failure.    I reviewed that she can undergo SBRT here at [facility] after obtaining prior DICOM-RT files.  We also discussed the option of returning to [facility] Rad[facility] given the short course of treatment, family support in transportation, and relative ease of repeat treatment where her prior RT has been given. She reports she has been thinking about this already. She had a good experience with Dr. Sandoval. She and her daughter believe this to be the most logical approach and will be reaching out to their [place] team. I emphasized that I remain available to offer RT here as well.    Mrs. Ayala and her daughter-in-law were very appreciative of our visit.    Thank you for the opportunity to  participate in the care of this patient.    Daxa Dodson MD  Department of Radiation Oncology  [facility]    No orders of the defined types were placed in this encounter.    No diagnosis found.    Total Time Spent  55 minutes spent reviewing EMR in preparation for visit, with the patient, coordination with other providers, and documentation.    CHIEF COMPLAINT  Chief Complaint   Patient presents with   • Consult     History of Present Illness  Janice Ayala 1940 is a 83 y.o. female with h/o non-small cell carcinoma of left lung.  Presents in consultation for discussion of SBRT.  Referred by Dr. Pinzon ([facility]).  Additional medical problems include a-fib, pulmonary hypertension, Stage 3a CKD.  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      Originally diagnosed with adenocarcinoma of SHAHEEN in 2020.  Underwent left upper lobe lobectomy 11/18/2020.  She completed 3 of 4 planned cycles of adjuvant Carboplatin + Pemetrexed 1/26/21.  CT CAP on 5/6/22 showed enlarging RML nodule, adjacent to heart border.  Nodule was presumed to be malignant.  She completed Empiric XRT to RML nodule 7/2022. Most recent brain MRI completed 8/2023 showed no evidence of disease     Surveillance Chest CT scans shows possible recurrent left nodule and new right lung nodule     2/29/24  CT  abdomen pelvis ([facility])  Impression  No definite findings to suggest metastatic disease in the abdomen and pelvis given noncontrast technique.     2/29/24  CT Chest wo contrast ([facility])  Impression  1. New masslike consolidation with air bronchograms in the LEFT lower lobe, at the location of the previously described irregular 9 mm nodule. These interval changes likely represent recent radiotherapy. An underlying malignant process is difficult to entirely exclude.  2. A 1 cm irregular pulmonary  nodule in the RIGHT upper lobe is suspicious for a primary bronchogenic malignancy.  3. Evolving groundglass opacities and linear densities within the RIGHT middle lobe and adjacent RIGHT lower lobe are nonspecific. An infectious/inflammatory process and posttreatment changes are included in the differential. A malignant process is difficult to entirely exclude.  4. No significant interval change to the aneurysmal dilatation of the thoracic aorta.  5. Questionable nodular density in the upper RIGHT breast. Correlation with mammographic imaging is recommended.  6. Additional chronic changes as described.  Thoracic aortic dilation is noted. Thoracic aortic aneurysm navigators to evaluate and follow up with the referring provider.      3/18/24  PET CT ([facility])  Impression  1. Mildly increased glucose metabolism of the RIGHT lower lobe pulmonary nodule. The lesion remains concerning for a new focus of carcinoma.  2. Increased glucose metabolism of the LEFT lung consolidation. This could be infectious, inflammatory or neoplastic in etiology, and short-term follow-up is recommended.  3. Small area of tiny nodular infiltrates with increased glucose metabolism within the RIGHT upper lobe thought to likely be infectious or inflammatory in nature.     24  Dr. Curry   Recent scans reviewed.  Will refer to radiation oncology for consideration of Emperic SBRT to RLL nodule.  Will repeat CT scans 24  [facility] ([facility])  Has substantial smoking history.  Will check PFT's and consider pulmonary rehab referral.     Upcomin24  Complete PFT's  24  Dr. Curry  24   Dr. Londono    Today, the patient feels well overall.  She presents with her daughter-in-law.  She previously lived near [place], and now lives in [place].  She reports tolerating her 2 prior courses of radiation quite well without significant residual toxicity.    Oncology History   Malignant neoplasm of right  lung (HCC)   2022 - 2022 Radiation        [facility]     1/10/2023 Initial Diagnosis    Non-small cell carcinoma of left lung (HCC)     2023 - 2023 Radiation      [facility]     3/18/2024 -  Cancer Staged    Staging form: Lung, AJCC 8th Edition  - Clinical stage from 3/18/2024: Stage IA1 (cT1a, cN0, cM0) - Signed by Daxa Dodson MD on 5/10/2024         Historical Information  Past Medical History:   Diagnosis Date   • Afib (HCC)    • Chronic kidney disease    • Essential hypertension    • Hyperlipidemia    • Osteoporosis    • Thyroid disease      Past Surgical History:   Procedure Laterality Date   • CAROTID ENDARTARECTOMY Left    • CT NEEDLE BIOPSY LUNG  10/5/2020   • HIP SURGERY Bilateral    • LUNG SURGERY Left    • US GUIDED THYROID BIOPSY  10/28/2020     Family History   Problem Relation Age of Onset   • Heart disease Mother    • Heart disease Father    • Kidney disease Father    • Asthma Brother      Social History  Social History     Substance and Sexual Activity   Alcohol Use Yes    Comment: rarely     Social History     Substance and Sexual Activity   Drug Use Never     Social History     Tobacco Use   Smoking Status Former   • Current packs/day: 0.00   • Average packs/day: 0.5 packs/day for 6.0 years (3.0 ttl pk-yrs)   • Types: Cigarettes   • Start date:    • Quit date:    • Years since quittin.3   Smokeless Tobacco Never     Meds/Allergies    Current Outpatient Medications:   •  docusate sodium (COLACE) 100 mg capsule, Take 100 mg by mouth if needed for constipation AS needed, Disp: , Rfl:   •  Eliquis 2.5 MG, Take 1 tablet (2.5 mg total) by mouth 2 (two) times a day, Disp: 180 tablet, Rfl: 0  •  esomeprazole (NexIUM) 40 MG capsule, TAKE ONE CAPSULE BY MOUTH EVERY MORNING, Disp: 90 capsule, Rfl: 1  •  ipratropium-albuterol (COMBIVENT RESPIMAT) inhaler, Inhale 1 puff every 6 (six) hours as needed (wheezing), Disp: 4 g, Rfl: 5  •  levothyroxine 88 mcg tablet, Take  "1 tablet (88 mcg total) by mouth daily, Disp: 30 tablet, Rfl: 5  •  olmesartan (BENICAR) 20 mg tablet, Take 1 tablet (20 mg total) by mouth daily, Disp: 90 tablet, Rfl: 1  •  rosuvastatin (CRESTOR) 10 MG tablet, Take 1 tablet (10 mg total) by mouth every evening, Disp: 90 tablet, Rfl: 1  •  amiodarone 100 mg tablet, Take 1 tablet (100 mg total) by mouth daily, Disp: 30 tablet, Rfl: 5  •  aspirin 81 mg chewable tablet, Chew 81 mg daily, Disp: , Rfl:   •  Calcium Carbonate (CALTRATE 600 PO), Take by mouth AS needed, Disp: , Rfl:   •  Loratadine 10 MG CAPS, Take 10 mg by mouth (Patient not taking: Reported on 5/10/2024), Disp: , Rfl:   •  simethicone (MYLICON) 125 MG chewable tablet, Chew 125 mg every 6 (six) hours as needed for flatulence (Patient not taking: Reported on 5/10/2024), Disp: , Rfl:   Allergies   Allergen Reactions   • Gabapentin Other (See Comments)     Weakness   • Nystatin-Triamcinolone Other (See Comments)     Inflammation     • Other Eye Swelling     Seasonal Allergies        Pathology:  See above.    Review of Systems  Refer to nursing note    OBJECTIVE:   /83 (BP Location: Left arm, Patient Position: Sitting)   Pulse 58   Temp (!) 97.2 °F (36.2 °C)   Resp 16   Ht 5' 1\" (1.549 m)   Wt 56.2 kg (124 lb)   SpO2 97%   BMI 23.43 kg/m²   Pain Assessment:  0  Performance Status: ECO    Physical Exam  General Appearance:  Alert, cooperative, no distress, appears stated age  Cardiovascular:  Extremities warm and well perfused  Lungs: Respirations unlabored, no cyanosis, able to speak in complete sentences without dyspnea.  Skin: No generalized rash or dermatitis  Neurologic: ANOx3, speech and cognition intact.    Portions of the record may have been created with voice recognition software.  Occasional wrong word or \"sound a like\" substitutions may have occurred due to the inherent limitations of voice recognition software.  Read the chart carefully and recognize, using context, where " substitutions have occurred.

## 2024-05-13 ENCOUNTER — TELEPHONE (OUTPATIENT)
Age: 84
End: 2024-05-13

## 2024-05-13 NOTE — TELEPHONE ENCOUNTER
Patient wanted us to be aware that she will be calling to make a follow up with Dr Black at some point, but she is currently waiting to receive radiation treatments. She will call us once they are complete.

## 2024-05-20 DIAGNOSIS — I48.0 PAROXYSMAL ATRIAL FIBRILLATION (HCC): ICD-10-CM

## 2024-05-22 ENCOUNTER — HOSPITAL ENCOUNTER (OUTPATIENT)
Dept: PULMONOLOGY | Facility: HOSPITAL | Age: 84
Discharge: HOME | End: 2024-05-22
Attending: INTERNAL MEDICINE
Payer: MEDICARE

## 2024-05-22 DIAGNOSIS — R06.09 DYSPNEA ON EXERTION: ICD-10-CM

## 2024-05-22 PROCEDURE — 94060 EVALUATION OF WHEEZING: CPT

## 2024-05-22 PROCEDURE — 94726 PLETHYSMOGRAPHY LUNG VOLUMES: CPT

## 2024-05-22 PROCEDURE — 94760 N-INVAS EAR/PLS OXIMETRY 1: CPT

## 2024-05-22 PROCEDURE — 94729 DIFFUSING CAPACITY: CPT | Performed by: INTERNAL MEDICINE

## 2024-05-22 PROCEDURE — 94729 DIFFUSING CAPACITY: CPT

## 2024-05-22 PROCEDURE — 94726 PLETHYSMOGRAPHY LUNG VOLUMES: CPT | Performed by: INTERNAL MEDICINE

## 2024-05-22 PROCEDURE — 94060 EVALUATION OF WHEEZING: CPT | Performed by: INTERNAL MEDICINE

## 2024-05-22 RX ORDER — ALBUTEROL SULFATE 2.5 MG/3ML
2.5 SOLUTION RESPIRATORY (INHALATION) ONCE AS NEEDED
Status: COMPLETED | OUTPATIENT
Start: 2024-05-22 | End: 2024-05-22

## 2024-05-22 RX ADMIN — ALBUTEROL SULFATE 2.5 MG: 2.5 SOLUTION RESPIRATORY (INHALATION) at 16:30

## 2024-06-07 ENCOUNTER — TELEPHONE (OUTPATIENT)
Age: 84
End: 2024-06-07

## 2024-06-07 NOTE — TELEPHONE ENCOUNTER
Patient calling stating she has to cancel the Echo scheduled for 6/26/24 because she is having radiation done on that day.

## 2024-06-28 ENCOUNTER — APPOINTMENT (OUTPATIENT)
Dept: LAB | Facility: CLINIC | Age: 84
End: 2024-06-28
Payer: MEDICARE

## 2024-06-28 DIAGNOSIS — C34.90 MALIGNANT NEOPLASM OF BRONCHUS AND LUNG (HCC): ICD-10-CM

## 2024-06-28 LAB
ALBUMIN SERPL BCG-MCNC: 3.7 G/DL (ref 3.5–5)
ALP SERPL-CCNC: 50 U/L (ref 34–104)
ALT SERPL W P-5'-P-CCNC: 18 U/L (ref 7–52)
ANION GAP SERPL CALCULATED.3IONS-SCNC: 13 MMOL/L (ref 4–13)
AST SERPL W P-5'-P-CCNC: 23 U/L (ref 13–39)
BASOPHILS # BLD AUTO: 0.03 THOUSANDS/ÂΜL (ref 0–0.1)
BASOPHILS NFR BLD AUTO: 1 % (ref 0–1)
BILIRUB SERPL-MCNC: 0.56 MG/DL (ref 0.2–1)
BUN SERPL-MCNC: 27 MG/DL (ref 5–25)
CALCIUM SERPL-MCNC: 9 MG/DL (ref 8.4–10.2)
CHLORIDE SERPL-SCNC: 105 MMOL/L (ref 96–108)
CO2 SERPL-SCNC: 21 MMOL/L (ref 21–32)
CREAT SERPL-MCNC: 1.67 MG/DL (ref 0.6–1.3)
EOSINOPHIL # BLD AUTO: 0.03 THOUSAND/ÂΜL (ref 0–0.61)
EOSINOPHIL NFR BLD AUTO: 1 % (ref 0–6)
ERYTHROCYTE [DISTWIDTH] IN BLOOD BY AUTOMATED COUNT: 13.2 % (ref 11.6–15.1)
FERRITIN SERPL-MCNC: 412 NG/ML (ref 11–307)
GFR SERPL CREATININE-BSD FRML MDRD: 27 ML/MIN/1.73SQ M
GLUCOSE P FAST SERPL-MCNC: 82 MG/DL (ref 65–99)
HCT VFR BLD AUTO: 37.7 % (ref 34.8–46.1)
HGB BLD-MCNC: 12 G/DL (ref 11.5–15.4)
IMM GRANULOCYTES # BLD AUTO: 0.02 THOUSAND/UL (ref 0–0.2)
IMM GRANULOCYTES NFR BLD AUTO: 0 % (ref 0–2)
IRON SATN MFR SERPL: 41 % (ref 15–50)
IRON SERPL-MCNC: 94 UG/DL (ref 50–212)
LYMPHOCYTES # BLD AUTO: 1.68 THOUSANDS/ÂΜL (ref 0.6–4.47)
LYMPHOCYTES NFR BLD AUTO: 28 % (ref 14–44)
MCH RBC QN AUTO: 32.7 PG (ref 26.8–34.3)
MCHC RBC AUTO-ENTMCNC: 31.8 G/DL (ref 31.4–37.4)
MCV RBC AUTO: 103 FL (ref 82–98)
MONOCYTES # BLD AUTO: 0.69 THOUSAND/ÂΜL (ref 0.17–1.22)
MONOCYTES NFR BLD AUTO: 11 % (ref 4–12)
NEUTROPHILS # BLD AUTO: 3.67 THOUSANDS/ÂΜL (ref 1.85–7.62)
NEUTS SEG NFR BLD AUTO: 59 % (ref 43–75)
NRBC BLD AUTO-RTO: 0 /100 WBCS
PLATELET # BLD AUTO: 220 THOUSANDS/UL (ref 149–390)
PMV BLD AUTO: 11.3 FL (ref 8.9–12.7)
POTASSIUM SERPL-SCNC: 4.4 MMOL/L (ref 3.5–5.3)
PROT SERPL-MCNC: 6.1 G/DL (ref 6.4–8.4)
RBC # BLD AUTO: 3.67 MILLION/UL (ref 3.81–5.12)
SODIUM SERPL-SCNC: 139 MMOL/L (ref 135–147)
TIBC SERPL-MCNC: 227 UG/DL (ref 250–450)
TSH SERPL DL<=0.05 MIU/L-ACNC: 0.46 UIU/ML (ref 0.45–4.5)
UIBC SERPL-MCNC: 133 UG/DL (ref 155–355)
WBC # BLD AUTO: 6.12 THOUSAND/UL (ref 4.31–10.16)

## 2024-06-28 PROCEDURE — 36415 COLL VENOUS BLD VENIPUNCTURE: CPT

## 2024-06-28 PROCEDURE — 80053 COMPREHEN METABOLIC PANEL: CPT

## 2024-06-28 PROCEDURE — 82728 ASSAY OF FERRITIN: CPT

## 2024-06-28 PROCEDURE — 85025 COMPLETE CBC W/AUTO DIFF WBC: CPT

## 2024-06-28 PROCEDURE — 83550 IRON BINDING TEST: CPT

## 2024-06-28 PROCEDURE — 83540 ASSAY OF IRON: CPT

## 2024-06-28 PROCEDURE — 84443 ASSAY THYROID STIM HORMONE: CPT

## 2024-07-24 ENCOUNTER — OFFICE VISIT (OUTPATIENT)
Dept: PULMONOLOGY | Facility: CLINIC | Age: 84
End: 2024-07-24
Payer: MEDICARE

## 2024-07-24 VITALS
OXYGEN SATURATION: 99 % | WEIGHT: 121.8 LBS | DIASTOLIC BLOOD PRESSURE: 72 MMHG | HEART RATE: 113 BPM | BODY MASS INDEX: 23 KG/M2 | SYSTOLIC BLOOD PRESSURE: 118 MMHG | HEIGHT: 61 IN | TEMPERATURE: 95.4 F

## 2024-07-24 DIAGNOSIS — C34.31 MALIGNANT NEOPLASM OF LOWER LOBE OF RIGHT LUNG (HCC): Primary | ICD-10-CM

## 2024-07-24 DIAGNOSIS — J45.909 UNCOMPLICATED ASTHMA, UNSPECIFIED ASTHMA SEVERITY, UNSPECIFIED WHETHER PERSISTENT: ICD-10-CM

## 2024-07-24 DIAGNOSIS — R06.09 DYSPNEA ON EXERTION: ICD-10-CM

## 2024-07-24 PROCEDURE — 99214 OFFICE O/P EST MOD 30 MIN: CPT | Performed by: INTERNAL MEDICINE

## 2024-07-24 PROCEDURE — G2211 COMPLEX E/M VISIT ADD ON: HCPCS | Performed by: INTERNAL MEDICINE

## 2024-07-24 NOTE — ASSESSMENT & PLAN NOTE
Not reporting significant dyspnea on exertion today  She has increased her activity since she last saw me and is exercising regularly and does not report significant symptoms  She has upcoming echocardiogram to reevaluate the previous tricuspid abnormality with suggestion of pulmonary hypertension  Saw cardiology recently locally and has seen electrophysiology as well  Remains on amiodarone and Eliquis

## 2024-07-24 NOTE — ASSESSMENT & PLAN NOTE
This is followed at [facility]  Completed radiation therapy  Recent CT report reviewed and suggestive of radiation changes but will need interval follow-up to ensure no growth of the nodule  She has upcoming follow-up scan scheduled

## 2024-07-24 NOTE — PROGRESS NOTES
Progress note - Pulmonary Medicine   Janice Ayala 84 y.o. female MRN: [id number]       Impression & Plan:     Asthma  Controlled on Combivent inhaler which she uses when needed  Has not required other long-acting bronchodilators   respiratory symptoms have been stable  Continue present therapy, prescription renewed    Malignant neoplasm of right lung (HCC)  This is followed at [facility]  Completed radiation therapy  Recent CT report reviewed and suggestive of radiation changes but will need interval follow-up to ensure no growth of the nodule  She has upcoming follow-up scan scheduled    Dyspnea on exertion  Not reporting significant dyspnea on exertion today  She has increased her activity since she last saw me and is exercising regularly and does not report significant symptoms  She has upcoming echocardiogram to reevaluate the previous tricuspid abnormality with suggestion of pulmonary hypertension  Saw cardiology recently locally and has seen electrophysiology as well  Remains on amiodarone and Eliquis    Return in about 6 months (around 1/24/2025).      ______________________________________________________________________    HPI:    Janice Ayala presents today for follow-up of asthma, lung cancer, and dyspnea.  She currently reports that she is doing well.  She has not seen me in more than a year.  She is not reporting significant dyspnea today.  She has increased her activity per my recommendation since the last time I have seen her.  She is exercising regularly approximately 3 times weekly.  No increased cough, phlegm, chest tightness, or chest pain.  She is followed for her lung cancer at [facility] and has recently had completion of radiation therapy.  Recent scan was somewhat indeterminate due to proximity to the radiation treatments and she has follow-up CAT scan scheduled as well    She denies any leg swelling, abdominal bloating, or signs or symptoms of pulmonary hypertension.  Previous  echocardiogram suggested significant pulmonary hypertension with severe tricuspid regurgitation.  She does have atrial fibrillation and is on amiodarone and Eliquis.  She saw Dr. Black for general cardiology recently.  She is also followed with an electrophysiologist in [place].  She has upcoming echocardiogram which I had ordered for her previously.  This is scheduled and pending    Current Medications:    Current Outpatient Medications:     amiodarone 100 mg tablet, Take 1 tablet (100 mg total) by mouth daily, Disp: 30 tablet, Rfl: 5    apixaban (Eliquis) 2.5 mg, Take 1 tablet (2.5 mg total) by mouth 2 (two) times a day, Disp: 180 tablet, Rfl: 1    docusate sodium (COLACE) 100 mg capsule, Take 100 mg by mouth if needed for constipation AS needed, Disp: , Rfl:     esomeprazole (NexIUM) 40 MG capsule, TAKE ONE CAPSULE BY MOUTH EVERY MORNING, Disp: 90 capsule, Rfl: 1    ipratropium-albuterol (COMBIVENT RESPIMAT) inhaler, Inhale 1 puff every 6 (six) hours as needed (wheezing), Disp: 4 g, Rfl: 6    levothyroxine 88 mcg tablet, Take 1 tablet (88 mcg total) by mouth daily, Disp: 30 tablet, Rfl: 5    olmesartan (BENICAR) 20 mg tablet, Take 1 tablet (20 mg total) by mouth daily, Disp: 90 tablet, Rfl: 1    rosuvastatin (CRESTOR) 10 MG tablet, Take 1 tablet (10 mg total) by mouth every evening, Disp: 90 tablet, Rfl: 1    Review of Systems:    Aside from what is mentioned in the HPI, the review of systems is otherwise negative    Past medical history, surgical history, and family history were reviewed and updated as appropriate    Social history updates:  Social History     Tobacco Use   Smoking Status Former    Current packs/day: 0.00    Average packs/day: 0.5 packs/day for 6.0 years (3.0 ttl pk-yrs)    Types: Cigarettes    Start date:     Quit date:     Years since quittin.6   Smokeless Tobacco Never       PhysicalExamination:  Vitals:   /72 (BP Location: Left arm, Patient Position: Sitting, Cuff Size:  "Standard)   Pulse (!) 113   Temp (!) 95.4 °F (35.2 °C) (Tympanic)   Ht 5' 1\" (1.549 m)   Wt 55.2 kg (121 lb 12.8 oz)   SpO2 99%   BMI 23.01 kg/m²     Gen:  Comfortable on room air.  No conversational dyspnea  HEENT:  Conjugate gaze.  sclerae anicteric.  Oropharynx moist  Neck: Trachea is midline. No JVD. No adenopathy  Chest: Excursion symmetric with slightly distant breath sounds.  No wheezing or crackles  Cardiac: Regular. no murmur  Abdomen:  benign  Extremities: No edema  Neuro:  Normal speech and mentation    Diagnostic Data:  Labs:  I personally reviewed the most recent laboratory data pertinent to today's visit    Lab Results   Component Value Date    WBC 6.12 06/28/2024    HGB 12.0 06/28/2024    HCT 37.7 06/28/2024     (H) 06/28/2024     06/28/2024     Lab Results   Component Value Date    SODIUM 139 06/28/2024    K 4.4 06/28/2024    CO2 21 06/28/2024     06/28/2024    BUN 27 (H) 06/28/2024    CREATININE 1.67 (H) 06/28/2024    CALCIUM 9.0 06/28/2024         Imaging:  I personally reviewed the images on the PAC system pertinent to today's visit  7/5/2024 CT scan shows 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  There is a right lower lobe lung nodule that has recently been treated with radiation therapy.  There was suggestion of growth but this was very close to radiation treatment and may be radiation effect    Hawk Londono MD  "

## 2024-07-24 NOTE — ASSESSMENT & PLAN NOTE
Controlled on Combivent inhaler which she uses when needed  Has not required other long-acting bronchodilators   respiratory symptoms have been stable  Continue present therapy, prescription renewed

## 2024-09-06 ENCOUNTER — OFFICE VISIT (OUTPATIENT)
Dept: FAMILY MEDICINE CLINIC | Facility: CLINIC | Age: 84
End: 2024-09-06
Payer: MEDICARE

## 2024-09-06 VITALS
HEIGHT: 61 IN | WEIGHT: 122.6 LBS | OXYGEN SATURATION: 95 % | SYSTOLIC BLOOD PRESSURE: 126 MMHG | BODY MASS INDEX: 23.15 KG/M2 | TEMPERATURE: 97.5 F | DIASTOLIC BLOOD PRESSURE: 78 MMHG | HEART RATE: 99 BPM

## 2024-09-06 DIAGNOSIS — E78.5 HYPERLIPIDEMIA, UNSPECIFIED HYPERLIPIDEMIA TYPE: ICD-10-CM

## 2024-09-06 DIAGNOSIS — I48.0 PAROXYSMAL ATRIAL FIBRILLATION (HCC): ICD-10-CM

## 2024-09-06 DIAGNOSIS — C34.31 MALIGNANT NEOPLASM OF LOWER LOBE OF RIGHT LUNG (HCC): ICD-10-CM

## 2024-09-06 DIAGNOSIS — K21.9 GASTROESOPHAGEAL REFLUX DISEASE WITHOUT ESOPHAGITIS: ICD-10-CM

## 2024-09-06 DIAGNOSIS — E03.9 ACQUIRED HYPOTHYROIDISM: ICD-10-CM

## 2024-09-06 DIAGNOSIS — R09.81 CHRONIC NASAL CONGESTION: Primary | ICD-10-CM

## 2024-09-06 PROCEDURE — G2211 COMPLEX E/M VISIT ADD ON: HCPCS | Performed by: FAMILY MEDICINE

## 2024-09-06 PROCEDURE — 99214 OFFICE O/P EST MOD 30 MIN: CPT | Performed by: FAMILY MEDICINE

## 2024-09-06 RX ORDER — IPRATROPIUM BROMIDE 42 UG/1
2 SPRAY, METERED NASAL 2 TIMES DAILY
Qty: 15 ML | Refills: 2 | Status: SHIPPED | OUTPATIENT
Start: 2024-09-06

## 2024-09-06 RX ORDER — ESOMEPRAZOLE MAGNESIUM 40 MG/1
40 CAPSULE, DELAYED RELEASE ORAL EVERY MORNING
Qty: 90 CAPSULE | Refills: 1 | Status: SHIPPED | OUTPATIENT
Start: 2024-09-06

## 2024-09-06 RX ORDER — LEVOTHYROXINE SODIUM 88 UG/1
88 TABLET ORAL DAILY
Qty: 30 TABLET | Refills: 5 | Status: SHIPPED | OUTPATIENT
Start: 2024-09-06

## 2024-09-06 RX ORDER — AMIODARONE HYDROCHLORIDE 100 MG/1
100 TABLET ORAL DAILY
Qty: 90 TABLET | Refills: 1 | Status: SHIPPED | OUTPATIENT
Start: 2024-09-06 | End: 2024-12-05

## 2024-09-06 NOTE — PROGRESS NOTES
Assessment/Plan:   1. Paroxysmal atrial fibrillation (HCC)  Stable.  She denies any recent chest pain or palpitations.  Continue with her current treatment of Eliquis as well as her amiodarone.  Continues well with her current treatment of olmesartan  - amiodarone 100 mg tablet; Take 1 tablet (100 mg total) by mouth daily  Dispense: 90 tablet; Refill: 1    2. Hyperlipidemia, unspecified hyperlipidemia type  Stable.  This time, recheck lipid panel in 6 months.  Continue with a strict low-fat/low-cholesterol as well as her current treatment with rosuvastatin.    3. Chronic nasal congestion  Patient with persisting chronic nasal congestion.  At this time, we will switch her to Atrovent nasal spray.  - ipratropium (ATROVENT) 0.06 % nasal spray; 2 sprays into each nostril 2 (two) times a day  Dispense: 15 mL; Refill: 2    4. Acquired hypothyroidism  Stable.  Recheck thyroid function testing.  Continue with her current dose of levothyroxine.  - levothyroxine 88 mcg tablet; Take 1 tablet (88 mcg total) by mouth daily  Dispense: 30 tablet; Refill: 5    5.  Malignant neoplasm of right lung  Patient following with oncology regularly.  At this time, she will be scheduled for a PET scan within the next few weeks.  She recently did have a new change in a lung nodule on her right lung.  Continue with regular follow-up with oncology.    6. Gastroesophageal reflux disease without esophagitis  Able.  At some, continue with dietary trigger avoidance as well as her current treatment with Nexium.  Follow-up in 6 months.  - esomeprazole (NexIUM) 40 MG capsule; Take 1 capsule (40 mg total) by mouth every morning  Dispense: 90 capsule; Refill: 1             There are no diagnoses linked to this encounter.      Subjective:       Chief Complaint   Patient presents with    Follow-up      Patient ID: Janice Ayala is a 84 y.o. female today for a follow-up on her chronic condition where she has A-fib, dyslipidemia, chronic nasal congestion,  hypothyroidism as well as GERD.  She is been taking medications regularly.  She denies adverse reactions with her medications.    HPI    Review of Systems   Constitutional:  Negative for activity change, chills, fatigue and fever.   HENT:  Negative for congestion, ear pain, sinus pressure and sore throat.    Eyes:  Negative for redness, itching and visual disturbance.   Respiratory:  Negative for cough and shortness of breath.    Cardiovascular:  Negative for chest pain and palpitations.   Gastrointestinal:  Negative for abdominal pain, diarrhea and nausea.   Endocrine: Negative for cold intolerance and heat intolerance.   Genitourinary:  Negative for dysuria, flank pain and frequency.   Musculoskeletal:  Negative for arthralgias, back pain, gait problem and myalgias.   Skin:  Negative for color change.   Allergic/Immunologic: Negative for environmental allergies.   Neurological:  Negative for dizziness, numbness and headaches. Facial asymmetry: .DIAGMED.  Psychiatric/Behavioral:  Negative for behavioral problems and sleep disturbance.        The following portions of the patient's history were reviewed and updated as appropriate : past family history, past medical history, past social history and past surgical history.    Current Outpatient Medications:     amiodarone 100 mg tablet, Take 1 tablet (100 mg total) by mouth daily, Disp: 30 tablet, Rfl: 5    apixaban (Eliquis) 2.5 mg, Take 1 tablet (2.5 mg total) by mouth 2 (two) times a day, Disp: 180 tablet, Rfl: 1    docusate sodium (COLACE) 100 mg capsule, Take 100 mg by mouth if needed for constipation AS needed, Disp: , Rfl:     esomeprazole (NexIUM) 40 MG capsule, TAKE ONE CAPSULE BY MOUTH EVERY MORNING, Disp: 90 capsule, Rfl: 1    ipratropium-albuterol (COMBIVENT RESPIMAT) inhaler, Inhale 1 puff every 6 (six) hours as needed (wheezing), Disp: 4 g, Rfl: 6    levothyroxine 88 mcg tablet, Take 1 tablet (88 mcg total) by mouth daily, Disp: 30 tablet, Rfl: 5     "olmesartan (BENICAR) 20 mg tablet, Take 1 tablet (20 mg total) by mouth daily, Disp: 90 tablet, Rfl: 1    rosuvastatin (CRESTOR) 10 MG tablet, Take 1 tablet (10 mg total) by mouth every evening, Disp: 90 tablet, Rfl: 1         Objective:         Vitals:    09/06/24 1043   BP: 126/78   BP Location: Left arm   Patient Position: Sitting   Cuff Size: Adult   Pulse: 99   Temp: 97.5 °F (36.4 °C)   TempSrc: Temporal   SpO2: 95%   Weight: 55.6 kg (122 lb 9.6 oz)   Height: 5' 1\" (1.549 m)     Physical Exam  Vitals reviewed.   Constitutional:       General: She is not in acute distress.     Appearance: Normal appearance. She is well-developed.   HENT:      Head: Normocephalic and atraumatic.      Right Ear: Tympanic membrane, ear canal and external ear normal. There is no impacted cerumen.      Left Ear: Tympanic membrane, ear canal and external ear normal. There is no impacted cerumen.      Nose: Nose normal. No congestion or rhinorrhea.      Mouth/Throat:      Mouth: Mucous membranes are moist.      Pharynx: No oropharyngeal exudate or posterior oropharyngeal erythema.   Eyes:      General: No scleral icterus.        Right eye: No discharge.         Left eye: No discharge.      Extraocular Movements: Extraocular movements intact.      Conjunctiva/sclera: Conjunctivae normal.      Pupils: Pupils are equal, round, and reactive to light.   Neck:      Trachea: No tracheal deviation.   Cardiovascular:      Rate and Rhythm: Normal rate and regular rhythm.      Pulses: Normal pulses.           Dorsalis pedis pulses are 2+ on the right side and 2+ on the left side.        Posterior tibial pulses are 2+ on the right side and 2+ on the left side.      Heart sounds: Normal heart sounds. No murmur heard.     No friction rub. No gallop.   Pulmonary:      Effort: Pulmonary effort is normal. No respiratory distress.      Breath sounds: Normal breath sounds. No wheezing, rhonchi or rales.   Abdominal:      General: Bowel sounds are normal. " There is no distension.      Palpations: Abdomen is soft.      Tenderness: There is no abdominal tenderness. There is no guarding or rebound.   Musculoskeletal:         General: Normal range of motion.      Cervical back: Normal range of motion and neck supple.      Right lower leg: No edema.      Left lower leg: No edema.   Lymphadenopathy:      Head:      Right side of head: No submental or submandibular adenopathy.      Left side of head: No submental or submandibular adenopathy.      Cervical: No cervical adenopathy.      Right cervical: No superficial, deep or posterior cervical adenopathy.     Left cervical: No superficial, deep or posterior cervical adenopathy.   Skin:     General: Skin is warm and dry.      Findings: No erythema.   Neurological:      General: No focal deficit present.      Mental Status: She is alert and oriented to person, place, and time.      Cranial Nerves: No cranial nerve deficit.      Sensory: Sensation is intact. No sensory deficit.      Motor: Motor function is intact.   Psychiatric:         Attention and Perception: Attention and perception normal.         Mood and Affect: Mood is not anxious or depressed.         Speech: Speech normal.         Behavior: Behavior normal.         Thought Content: Thought content normal.         Judgment: Judgment normal.

## 2024-09-12 ENCOUNTER — TELEPHONE (OUTPATIENT)
Age: 84
End: 2024-09-12

## 2024-09-12 DIAGNOSIS — U07.1 COVID-19: Primary | ICD-10-CM

## 2024-09-12 RX ORDER — MOLNUPIRAVIR 200 MG/1
800 CAPSULE ORAL EVERY 12 HOURS
Qty: 40 CAPSULE | Refills: 0 | Status: CANCELLED | OUTPATIENT
Start: 2024-09-12 | End: 2024-09-17

## 2024-09-12 NOTE — TELEPHONE ENCOUNTER
Janice tested COVID Positive 9/11, congestion and SOB.  Stated told by Dr. Pruett to just call into office and he will call in a medication to [facility].

## 2024-09-12 NOTE — TELEPHONE ENCOUNTER
Pt called, she is following up on a medication request for COVID.    Relayed the following provider message verbatim:    She is beyond the treatment criteria to start treatment.  It may only be started with fives days of having symptoms     Pt is requesting a return call, is not in agreement with this.  Pt stated her symptoms did NOT start 5 days prior. They started yesterday 9/11, which when she called. She is feeling weak, having shortness of breath, (symptoms 9/6 have nothing do do with COVID - she tested positive yesterday 9/11)  Due to medical history, she needs medication.   Please advise.

## 2024-09-12 NOTE — TELEPHONE ENCOUNTER
Spoke with patient. Symptoms started about 5 days ago. Never had a temp and shortness of breath only upon exertion. Patient stated all in all she feels good.

## 2024-09-12 NOTE — TELEPHONE ENCOUNTER
"Pt called the after hours, after I stated that I was going to send a message to our nurses to call her back. Pt stated, \"they told me that before and they never called me back.\" I tried to explain to the patient that in the after hours dept she will get a call back tonight. Pt stated, \"You know what forget it. I call back in the morning. Thank you.\" And she disconnected the call.  "

## 2024-09-13 DIAGNOSIS — U07.1 COVID-19: Primary | ICD-10-CM

## 2024-09-13 RX ORDER — MOLNUPIRAVIR 200 MG/1
800 CAPSULE ORAL EVERY 12 HOURS
Qty: 40 CAPSULE | Refills: 0 | Status: SHIPPED | OUTPATIENT
Start: 2024-09-13 | End: 2024-09-18

## 2024-09-13 NOTE — TELEPHONE ENCOUNTER
Pt called back stating that her insurance won't cover the medication and it will cost her $300. Plus the pharmacy doesn't have it available. Please recommend a new medication and inform pt of new recommendation.

## 2024-09-13 NOTE — TELEPHONE ENCOUNTER
Spoke with patient and her friend Elidia. There was a mistake with timeline of when symptoms started according to Elidia. Kailey's symptoms started on Tuesday. She was experiencing increased fatigue. On Wednesday she was sounded congested and that is when she was tested and found to be positive for Covid.   Would like a script for Paxlovid sent to her pharmacy.

## 2024-09-14 ENCOUNTER — HOSPITAL ENCOUNTER (EMERGENCY)
Facility: HOSPITAL | Age: 84
Discharge: HOME | End: 2024-09-14
Attending: EMERGENCY MEDICINE
Payer: MEDICARE

## 2024-09-14 ENCOUNTER — APPOINTMENT (EMERGENCY)
Dept: RADIOLOGY | Facility: HOSPITAL | Age: 84
End: 2024-09-14
Payer: MEDICARE

## 2024-09-14 ENCOUNTER — NURSE TRIAGE (OUTPATIENT)
Dept: OTHER | Facility: OTHER | Age: 84
End: 2024-09-14

## 2024-09-14 VITALS
BODY MASS INDEX: 22.7 KG/M2 | DIASTOLIC BLOOD PRESSURE: 63 MMHG | WEIGHT: 120.15 LBS | OXYGEN SATURATION: 98 % | HEART RATE: 97 BPM | TEMPERATURE: 99.1 F | RESPIRATION RATE: 19 BRPM | SYSTOLIC BLOOD PRESSURE: 123 MMHG

## 2024-09-14 DIAGNOSIS — N17.9 AKI (ACUTE KIDNEY INJURY) (HCC): ICD-10-CM

## 2024-09-14 DIAGNOSIS — J12.82 PNEUMONIA DUE TO COVID-19 VIRUS: Primary | ICD-10-CM

## 2024-09-14 DIAGNOSIS — E83.42 HYPOMAGNESEMIA: ICD-10-CM

## 2024-09-14 DIAGNOSIS — U07.1 PNEUMONIA DUE TO COVID-19 VIRUS: Primary | ICD-10-CM

## 2024-09-14 LAB
2HR DELTA HS TROPONIN: -1 NG/L
ALBUMIN SERPL BCG-MCNC: 3.6 G/DL (ref 3.5–5)
ALP SERPL-CCNC: 53 U/L (ref 34–104)
ALT SERPL W P-5'-P-CCNC: 16 U/L (ref 7–52)
ANION GAP SERPL CALCULATED.3IONS-SCNC: 11 MMOL/L (ref 4–13)
APTT PPP: 36 SECONDS (ref 23–34)
AST SERPL W P-5'-P-CCNC: 27 U/L (ref 13–39)
ATRIAL RATE: 104 BPM
ATRIAL RATE: 108 BPM
BASOPHILS # BLD AUTO: 0.02 THOUSANDS/ΜL (ref 0–0.1)
BASOPHILS NFR BLD AUTO: 0 % (ref 0–1)
BILIRUB SERPL-MCNC: 0.59 MG/DL (ref 0.2–1)
BNP SERPL-MCNC: 122 PG/ML (ref 0–100)
BUN SERPL-MCNC: 21 MG/DL (ref 5–25)
CALCIUM SERPL-MCNC: 8.8 MG/DL (ref 8.4–10.2)
CARDIAC TROPONIN I PNL SERPL HS: 12 NG/L
CARDIAC TROPONIN I PNL SERPL HS: 13 NG/L
CHLORIDE SERPL-SCNC: 100 MMOL/L (ref 96–108)
CK SERPL-CCNC: 25 U/L (ref 26–192)
CO2 SERPL-SCNC: 23 MMOL/L (ref 21–32)
CREAT SERPL-MCNC: 1.76 MG/DL (ref 0.6–1.3)
CRP SERPL HS-MCNC: 60.2 MG/L
EOSINOPHIL # BLD AUTO: 0.01 THOUSAND/ΜL (ref 0–0.61)
EOSINOPHIL NFR BLD AUTO: 0 % (ref 0–6)
ERYTHROCYTE [DISTWIDTH] IN BLOOD BY AUTOMATED COUNT: 12.8 % (ref 11.6–15.1)
GFR SERPL CREATININE-BSD FRML MDRD: 25 ML/MIN/1.73SQ M
GLUCOSE SERPL-MCNC: 91 MG/DL (ref 65–140)
HCT VFR BLD AUTO: 36.9 % (ref 34.8–46.1)
HGB BLD-MCNC: 11.9 G/DL (ref 11.5–15.4)
IMM GRANULOCYTES # BLD AUTO: 0.03 THOUSAND/UL (ref 0–0.2)
IMM GRANULOCYTES NFR BLD AUTO: 0 % (ref 0–2)
INR PPP: 1.22 (ref 0.85–1.19)
LYMPHOCYTES # BLD AUTO: 0.69 THOUSANDS/ΜL (ref 0.6–4.47)
LYMPHOCYTES NFR BLD AUTO: 6 % (ref 14–44)
MAGNESIUM SERPL-MCNC: 1.5 MG/DL (ref 1.9–2.7)
MCH RBC QN AUTO: 31.8 PG (ref 26.8–34.3)
MCHC RBC AUTO-ENTMCNC: 32.2 G/DL (ref 31.4–37.4)
MCV RBC AUTO: 99 FL (ref 82–98)
MONOCYTES # BLD AUTO: 0.77 THOUSAND/ΜL (ref 0.17–1.22)
MONOCYTES NFR BLD AUTO: 7 % (ref 4–12)
NEUTROPHILS # BLD AUTO: 10.04 THOUSANDS/ΜL (ref 1.85–7.62)
NEUTS SEG NFR BLD AUTO: 87 % (ref 43–75)
NRBC BLD AUTO-RTO: 0 /100 WBCS
P AXIS: 58 DEGREES
P AXIS: 7 DEGREES
PLATELET # BLD AUTO: 167 THOUSANDS/UL (ref 149–390)
PMV BLD AUTO: 9.9 FL (ref 8.9–12.7)
POTASSIUM SERPL-SCNC: 4.3 MMOL/L (ref 3.5–5.3)
PR INTERVAL: 166 MS
PR INTERVAL: 188 MS
PROT SERPL-MCNC: 6.4 G/DL (ref 6.4–8.4)
PROTHROMBIN TIME: 15.6 SECONDS (ref 12.3–15)
QRS AXIS: -47 DEGREES
QRS AXIS: -48 DEGREES
QRSD INTERVAL: 106 MS
QRSD INTERVAL: 108 MS
QT INTERVAL: 326 MS
QT INTERVAL: 360 MS
QTC INTERVAL: 436 MS
QTC INTERVAL: 473 MS
RBC # BLD AUTO: 3.74 MILLION/UL (ref 3.81–5.12)
SODIUM SERPL-SCNC: 134 MMOL/L (ref 135–147)
T WAVE AXIS: 119 DEGREES
T WAVE AXIS: 120 DEGREES
VENTRICULAR RATE: 104 BPM
VENTRICULAR RATE: 108 BPM
WBC # BLD AUTO: 11.56 THOUSAND/UL (ref 4.31–10.16)

## 2024-09-14 PROCEDURE — 82550 ASSAY OF CK (CPK): CPT | Performed by: EMERGENCY MEDICINE

## 2024-09-14 PROCEDURE — 85610 PROTHROMBIN TIME: CPT | Performed by: EMERGENCY MEDICINE

## 2024-09-14 PROCEDURE — 99285 EMERGENCY DEPT VISIT HI MDM: CPT | Performed by: EMERGENCY MEDICINE

## 2024-09-14 PROCEDURE — 83880 ASSAY OF NATRIURETIC PEPTIDE: CPT | Performed by: EMERGENCY MEDICINE

## 2024-09-14 PROCEDURE — 85025 COMPLETE CBC W/AUTO DIFF WBC: CPT | Performed by: EMERGENCY MEDICINE

## 2024-09-14 PROCEDURE — 99285 EMERGENCY DEPT VISIT HI MDM: CPT

## 2024-09-14 PROCEDURE — 86141 C-REACTIVE PROTEIN HS: CPT | Performed by: EMERGENCY MEDICINE

## 2024-09-14 PROCEDURE — 36415 COLL VENOUS BLD VENIPUNCTURE: CPT | Performed by: EMERGENCY MEDICINE

## 2024-09-14 PROCEDURE — 85730 THROMBOPLASTIN TIME PARTIAL: CPT | Performed by: EMERGENCY MEDICINE

## 2024-09-14 PROCEDURE — 93010 ELECTROCARDIOGRAM REPORT: CPT | Performed by: INTERNAL MEDICINE

## 2024-09-14 PROCEDURE — 96366 THER/PROPH/DIAG IV INF ADDON: CPT

## 2024-09-14 PROCEDURE — 84484 ASSAY OF TROPONIN QUANT: CPT | Performed by: EMERGENCY MEDICINE

## 2024-09-14 PROCEDURE — 71046 X-RAY EXAM CHEST 2 VIEWS: CPT

## 2024-09-14 PROCEDURE — 83735 ASSAY OF MAGNESIUM: CPT | Performed by: EMERGENCY MEDICINE

## 2024-09-14 PROCEDURE — 96365 THER/PROPH/DIAG IV INF INIT: CPT

## 2024-09-14 PROCEDURE — 93005 ELECTROCARDIOGRAM TRACING: CPT

## 2024-09-14 PROCEDURE — 80053 COMPREHEN METABOLIC PANEL: CPT | Performed by: EMERGENCY MEDICINE

## 2024-09-14 RX ORDER — UREA 10 %
500 LOTION (ML) TOPICAL ONCE
Status: COMPLETED | OUTPATIENT
Start: 2024-09-14 | End: 2024-09-14

## 2024-09-14 RX ORDER — UREA 10 %
500 LOTION (ML) TOPICAL 2 TIMES DAILY
Qty: 10 TABLET | Refills: 0 | Status: SHIPPED | OUTPATIENT
Start: 2024-09-14 | End: 2024-09-19

## 2024-09-14 RX ADMIN — SODIUM CHLORIDE, SODIUM LACTATE, POTASSIUM CHLORIDE, AND CALCIUM CHLORIDE 500 ML: .6; .31; .03; .02 INJECTION, SOLUTION INTRAVENOUS at 11:53

## 2024-09-14 RX ADMIN — Medication 500 MG: at 14:03

## 2024-09-14 NOTE — TELEPHONE ENCOUNTER
Reason for Disposition  • Oxygen level (e.g., pulse oximetry) 91 to 94 percent    Protocols used: Coronavirus (COVID-19) Persisting Symptoms Follow-up Call-ADULT-

## 2024-09-14 NOTE — TELEPHONE ENCOUNTER
"Reason for Disposition   [1] PERSISTING SYMPTOMS OF COVID-19 AND [2] NEW symptom AND [3] could be serious    Answer Assessment - Initial Assessment Questions  1. COVID-19 ONSET: \"When did the symptoms of COVID-19 first start?\"      9/11    2. DIAGNOSIS CONFIRMATION: \"How were you diagnosed?\" (e.g., COVID-19 oral or nasal viral test; COVID-19 antibody test; doctor visit)      Covid home test    3. MAIN SYMPTOM:  \"What is your main concern or symptom right now?\" (e.g., breathing difficulty, cough, fatigue. loss of smell)      Shakiness and weakness    4. SYMPTOM ONSET: \"When did the  symptoms  start?\"      9/14    5. BETTER-SAME-WORSE: \"Are you getting better, staying the same, or getting worse over the last 1 to 2 weeks?\"      Worse since taking antiviral      6. RECENT MEDICAL VISIT: \"Have you been seen by a healthcare provider (doctor, NP, PA) for these persisting COVID-19 symptoms?\" If Yes, ask: \"When were you seen?\" (e.g., date)      No    7. COUGH: \"Do you have a cough?\" If Yes, ask: \"How bad is the cough?\"        Mild cough- not worse than normal      8. FEVER: \"Do you have a fever?\" If Yes, ask: \"What is your temperature, how was it measured, and when did it start?\"      No temp 98.2 (temporal)      9. BREATHING DIFFICULTY: \"Are you having any trouble breathing?\" If Yes, ask: \"How bad is your breathing?\" (e.g., mild, moderate, severe)     - MILD: No SOB at rest, mild SOB with walking, speaks normally in sentences, can lie down, no retractions, pulse < 100.     - MODERATE: SOB at rest, SOB with minimal exertion and prefers to sit, cannot lie down flat, speaks in phrases, mild retractions, audible wheezing, pulse 100-120.     - SEVERE: Very SOB at rest, speaks in single words, struggling to breathe, sitting hunched forward, retractions, pulse > 120        Denies    10. HIGH RISK DISEASE: \"Do you have any chronic medical problems?\" (e.g., asthma, heart or lung disease, weak immune system, obesity, etc.)        " "Asthma    11. OTHER SYMPTOMS: \"Do you have any other symptoms?\"  (e.g., fatigue, headache, muscle pain, weakness)        Shaky, weakness    12. O2 SATURATION MONITOR:  \"Do you use an oxygen saturation monitor (pulse oximeter) at home?\" If Yes, ask \"What is your reading (oxygen level) today?\" \"What is your usual oxygen saturation reading?\" (e.g., 95%)    92%    Protocols used: Coronavirus (COVID-19) Persisting Symptoms Follow-up Call-ADULT-[facility]      [facility] message sent to on call provider. Provider stated patient needs to go to ER to be evaluated. Informed patients daughter in law and she verbalized understanding.   "

## 2024-09-14 NOTE — ED PROVIDER NOTES
1. Pneumonia due to COVID-19 virus    2. GEM (acute kidney injury) (HCC)    3. Hypomagnesemia      ED Disposition       ED Disposition   Discharge    Condition   Stable    Date/Time   Sat Sep 14, 2024  3:11 PM    Comment   Janice Ayala discharge to home/self care.                   Assessment & Plan       Medical Decision Making  Amount and/or Complexity of Data Reviewed  Labs: ordered. Decision-making details documented in ED Course.  Radiology: ordered and independent interpretation performed.    Risk  OTC drugs.      Patient with what looks like she has COVID-pneumonia.  She is not hypoxic and her ambulatory pulse ox was normal.  She is little tachycardic which I suspect could be due to a little dehydration and COVID.  Her kidney function is up a little bit from her prior.  Unclear if this could also be medication reaction to the new medication so after discussion we elected to stop the medication.  Will need to hold off on Paxlovid as her GFR is less than 30.  Her cardiac tests were negative x 2 with no acute ischemic changes on the EKG.  Did call over to radiology to have them read the x-ray but unfortunately they have been unable to read it at this point.  I suspect that she has pneumonia on the right side I still see the scarring on the left.  The patient has an active MyChart account and if there is any discrepancies they can certainly see that on the account and follow-up with their physician.  Leave that this is all due to COVID-pneumonia does not require any antibiotics at this point.  He appears quite well and actually feels well.  Does not require inpatient hospitalization.            ED Course as of 09/14/24 1543   Sat Sep 14, 2024   1238 CT result of the chest from [facility] on July 5:  Stable postoperative change in the LEFT lung with prior lobectomy and radiation fibrosis.     12 mm RIGHT lower lobe nodule has mildly increased since the prior CT, previously 10 mm. This could potentially reflect  "radiation sequela as this lesion was very recently treated.     Recommend short interval follow-up in 3 months.     Additional chronic findings are unchanged including aneurysm dilation of the aortic arch.      1455 Delta 2hr hsTnI: -1  Troponin negative   1516 MAGNESIUM(!): 1.5  Slightly low can replace orally.   1516 CBC and differential(!)  Slightly elevated white count secondary to her COVID/pneumonia.  She is not anemic.  Platelet count normal.   1516 BNP(!): 122  Patient does not examine like she is in heart failure.  No old for comparison.   1516 Comprehensive metabolic panel(!)  GEM slightly worse than normal with a GFR of 25.  Slightly low sodium.  Her potassium the rest of her electrolytes and liver functions are normal.       Medications   lactated ringers bolus 500 mL (0 mL Intravenous Stopped 9/14/24 1343)   magnesium gluconate (MAGONATE) tablet 500 mg (500 mg Oral Given 9/14/24 1403)       History of Present Illness       HPI  Chief Complaint   Patient presents with    Medical Problem     Pt diagnosed with covid 9/11. Pt states she had no symptoms but she felt slightly SOB so her daughter tested her and she came back positive. Pt took the antiviral and felt like her insides were \"burning up\". Pt recommend by PCP to come to ED by PCP. Pt has no complaints at this time      Past Medical History:   Diagnosis Date    Afib (HCC)     Chronic kidney disease     Essential hypertension     Hyperlipidemia     Osteoporosis     Thyroid disease      Past Surgical History:   Procedure Laterality Date    CAROTID ENDARTARECTOMY Left     CT NEEDLE BIOPSY LUNG  10/5/2020    HIP SURGERY Bilateral     LUNG SURGERY Left     US GUIDED THYROID BIOPSY  10/28/2020     Prior to Admission medications    Medication Sig Start Date End Date Taking? Authorizing Provider   magnesium gluconate (MAGONATE) 500 mg tablet Take 1 tablet (500 mg total) by mouth 2 (two) times a day for 5 days 9/14/24 9/19/24 Yes Hawk Mccormick MD "   amiodarone 100 mg tablet Take 1 tablet (100 mg total) by mouth daily 9/6/24 12/5/24  ab Seble DO   apixaban (Eliquis) 2.5 mg Take 1 tablet (2.5 mg total) by mouth 2 (two) times a day 5/20/24   ab Seble DO   docusate sodium (COLACE) 100 mg capsule Take 100 mg by mouth if needed for constipation AS needed    Historical Provider, MD   esomeprazole (NexIUM) 40 MG capsule Take 1 capsule (40 mg total) by mouth every morning 9/6/24   [facility] DO Seble   ipratropium (ATROVENT) 0.06 % nasal spray 2 sprays into each nostril 2 (two) times a day 9/6/24   ab Seble DO   ipratropium-albuterol (COMBIVENT RESPIMAT) inhaler Inhale 1 puff every 6 (six) hours as needed (wheezing) 7/24/24 6/11/25  Hawk Londono MD   levothyroxine 88 mcg tablet Take 1 tablet (88 mcg total) by mouth daily 9/6/24   [facility] DO eSble   molnupiravir (Lagevrio) 200 mg capsule Take 4 capsules (800 mg total) by mouth every 12 (twelve) hours for 5 days 9/13/24 9/18/24  ab Seble DO   olmesartan (BENICAR) 20 mg tablet Take 1 tablet (20 mg total) by mouth daily 5/6/24 11/2/24  [facility] DO Seble   rosuvastatin (CRESTOR) 10 MG tablet Take 1 tablet (10 mg total) by mouth every evening 5/7/24 9/6/24  [facility] DO Seble       Patient has a history of lung cancer getting radiation therapy with scarring on the left side, pulmonary hypertension.  She does not use oxygen at home.  She was exposed to COVID around the sixth started to get short of breath on the 10th and then tested positive for COVID.  She was actually given a prescription for molnupiravir which she took the first dose yesterday and then felt very unwell about 10 minutes afterwards.  He felt hot but did not have a fever.  She felt like her heart was beating faster.  He has been having exertional shortness of breath for the last couple of days thus necessitating her testing her cell for COVID but she did have a positive COVID exposure back on the sixth.  He is vaccinated for COVID.   She has no chest pain.  She actually feels better now.  No abdominal pain.  No nausea or vomiting.  No diarrhea.  She has been drinking a little bit less.  She is urinating normally.    Review of Systems        Objective     ED Triage Vitals [09/14/24 1104]   Temperature Pulse Blood Pressure Respirations SpO2 Patient Position - Orthostatic VS   99.1 °F (37.3 °C) (!) 110 (S) (!) 83/49 16 100 % Sitting      Temp Source Heart Rate Source BP Location FiO2 (%) Pain Score    Oral Monitor Right arm -- --        Physical Exam  Vitals and nursing note reviewed.   Constitutional:       General: She is not in acute distress.     Appearance: Normal appearance. She is well-developed. She is not ill-appearing, toxic-appearing or diaphoretic.   HENT:      Head: Normocephalic and atraumatic.      Right Ear: Hearing normal. No drainage or swelling.      Left Ear: Hearing normal. No drainage or swelling.   Eyes:      General: Lids are normal.         Right eye: No discharge.         Left eye: No discharge.      Conjunctiva/sclera: Conjunctivae normal.   Neck:      Vascular: No JVD.      Trachea: Trachea normal.   Cardiovascular:      Rate and Rhythm: Normal rate and regular rhythm.      Pulses: Normal pulses.      Heart sounds: Normal heart sounds. No murmur heard.     No friction rub. No gallop.   Pulmonary:      Effort: Pulmonary effort is normal. No respiratory distress.      Breath sounds: Normal breath sounds. No stridor. No wheezing or rales.   Chest:      Chest wall: No tenderness.   Abdominal:      Palpations: Abdomen is soft.      Tenderness: There is no abdominal tenderness. There is no guarding or rebound.   Musculoskeletal:         General: Normal range of motion.      Cervical back: Normal range of motion.      Right lower leg: No edema.      Left lower leg: No edema.   Skin:     General: Skin is warm and dry.      Coloration: Skin is not pale.      Findings: No rash.   Neurological:      General: No focal deficit  present.      Mental Status: She is alert.      GCS: GCS eye subscore is 4. GCS verbal subscore is 5. GCS motor subscore is 6.      Sensory: No sensory deficit.      Motor: No abnormal muscle tone.   Psychiatric:         Mood and Affect: Mood normal.         Speech: Speech normal.         Behavior: Behavior is cooperative.         Labs Reviewed   CBC AND DIFFERENTIAL - Abnormal       Result Value    WBC 11.56 (*)     RBC 3.74 (*)     Hemoglobin 11.9      Hematocrit 36.9      MCV 99 (*)     MCH 31.8      MCHC 32.2      RDW 12.8      MPV 9.9      Platelets 167      nRBC 0      Segmented % 87 (*)     Immature Grans % 0      Lymphocytes % 6 (*)     Monocytes % 7      Eosinophils Relative 0      Basophils Relative 0      Absolute Neutrophils 10.04 (*)     Absolute Immature Grans 0.03      Absolute Lymphocytes 0.69      Absolute Monocytes 0.77      Eosinophils Absolute 0.01      Basophils Absolute 0.02     PROTIME-INR - Abnormal    Protime 15.6 (*)     INR 1.22 (*)     Narrative:     INR Therapeutic Range    Indication                                             INR Range      Atrial Fibrillation                                               2.0-3.0  Hypercoagulable State                                    2.0.2.3  Left Ventricular Asist Device                            2.0-3.0  Mechanical Heart Valve                                  -    Aortic(with afib, MI, embolism, HF, LA enlargement,    and/or coagulopathy)                                     2.0-3.0 (2.5-3.5)     Mitral                                                             2.5-3.5  Prosthetic/Bioprosthetic Heart Valve               2.0-3.0  Venous thromboembolism (VTE: VT, PE        2.0-3.0   APTT - Abnormal    PTT 36 (*)    COMPREHENSIVE METABOLIC PANEL - Abnormal    Sodium 134 (*)     Potassium 4.3      Chloride 100      CO2 23      ANION GAP 11      BUN 21      Creatinine 1.76 (*)     Glucose 91      Calcium 8.8      AST 27      ALT 16      Alkaline  Phosphatase 53      Total Protein 6.4      Albumin 3.6      Total Bilirubin 0.59      eGFR 25      Narrative:     National Kid[employer] Foundation guidelines for Chronic Kidney Disease (CKD):     Stage 1 with normal or high GFR (GFR > 90 mL/min/1.73 square meters)    Stage 2 Mild CKD (GFR = 60-89 mL/min/1.73 square meters)    Stage 3A Moderate CKD (GFR = 45-59 mL/min/1.73 square meters)    Stage 3B Moderate CKD (GFR = 30-44 mL/min/1.73 square meters)    Stage 4 Severe CKD (GFR = 15-29 mL/min/1.73 square meters)    Stage 5 End Stage CKD (GFR <15 mL/min/1.73 square meters)  Note: GFR calculation is accurate only with a steady state creatinine   MAGNESIUM - Abnormal    Magnesium 1.5 (*)    HIGH SENSITIVITY C-REACTIVE PROTEIN - Abnormal    CRP, High Sensitivity 60.20 (*)     Narrative:           HsCRP Level       Relative Risk           <1.0 mg/L          Low           1.0 to 3.0 mg/L    Average           >3.0 mg/L          High       B-TYPE NATRIURETIC PEPTIDE (BNP) - Abnormal     (*)    CK - Abnormal    Total CK 25 (*)    HS TROPONIN I 0HR - Normal    hs TnI 0hr 13     HS TROPONIN I 2HR - Normal    hs TnI 2hr 12      Delta 2hr hsTnI -1     HS TROPONIN I 4HR     XR chest 2 views   ED Interpretation by Hawk Mccormick MD (09/14 9841)   I have reviewed the film, per my independent interpretation : scarring left lung, possible infiltrate on right..         by Jayy Hilliard MD (09/14 7244)          ECG 12 Lead Documentation Only    Date/Time: 9/14/2024 2:21 PM    Performed by: Hawk Mccormick MD  Authorized by: Hawk Mccormick MD    Indications / Diagnosis:  Covid  Patient location:  ED  Interpretation:     Interpretation: non-specific    Rate:     ECG rate:  108    ECG rate assessment: tachycardic    Rhythm:     Rhythm: sinus tachycardia    Ectopy:     Ectopy: none    QRS:     QRS axis:  Left    QRS intervals:  Normal  Conduction:     Conduction: normal    ST segments:     ST  segments:  Normal  T waves:     T waves: non-specific    Other findings:     Other findings: LVH           Hawk Mccormick MD  09/14/24 0840

## 2024-09-14 NOTE — ED NOTES
Ambulated patient in hallway patient maintained 97%. Patient was sob but stated that she is always and her doctors are aware of it as well.      Klaudia Pedraza  09/14/24 6778

## 2024-09-16 ENCOUNTER — VBI (OUTPATIENT)
Dept: FAMILY MEDICINE CLINIC | Facility: CLINIC | Age: 84
End: 2024-09-16

## 2024-09-16 NOTE — TELEPHONE ENCOUNTER
09/16/24 9:50 AM    Patient contacted post ED visit, [facility] department spoke with patient/caregiver and outreach was successful.    Thank you.  Dana Talbert  [facility]

## 2024-09-17 ENCOUNTER — HOSPITAL ENCOUNTER (OUTPATIENT)
Dept: NON INVASIVE DIAGNOSTICS | Facility: HOSPITAL | Age: 84
Discharge: HOME | End: 2024-09-17
Payer: MEDICARE

## 2024-09-17 VITALS
DIASTOLIC BLOOD PRESSURE: 51 MMHG | HEART RATE: 98 BPM | BODY MASS INDEX: 22.66 KG/M2 | SYSTOLIC BLOOD PRESSURE: 108 MMHG | HEIGHT: 61 IN | WEIGHT: 120 LBS

## 2024-09-17 DIAGNOSIS — I27.20 PULMONARY HYPERTENSION (HCC): ICD-10-CM

## 2024-09-17 LAB
AORTIC ROOT: 2.8 CM
AORTIC VALVE MEAN VELOCITY: 8.3 M/S
APICAL FOUR CHAMBER EJECTION FRACTION: 55 %
AV AREA BY CONTINUOUS VTI: 2 CM2
AV AREA PEAK VELOCITY: 2.1 CM2
AV LVOT MEAN GRADIENT: 2 MMHG
AV LVOT PEAK GRADIENT: 3 MMHG
AV MEAN GRADIENT: 3 MMHG
AV PEAK GRADIENT: 6 MMHG
AV REGURGITATION PRESSURE HALF TIME: 341 MS
AV VALVE AREA: 2.03 CM2
AV VELOCITY RATIO: 0.76
BSA FOR ECHO PROCEDURE: 1.52 M2
DOP CALC AO PEAK VEL: 1.19 M/S
DOP CALC AO VTI: 21.87 CM
DOP CALC LVOT AREA: 2.83 CM2
DOP CALC LVOT CARDIAC INDEX: 3.04 L/MIN/M2
DOP CALC LVOT CARDIAC OUTPUT: 4.62 L/MIN
DOP CALC LVOT DIAMETER: 1.9 CM
DOP CALC LVOT PEAK VEL VTI: 15.64 CM
DOP CALC LVOT PEAK VEL: 0.9 M/S
DOP CALC LVOT STROKE INDEX: 30.9 ML/M2
DOP CALC LVOT STROKE VOLUME: 44.32
LAAS-AP2: 14.6 CM2
LAAS-AP4: 16.8 CM2
LEFT ATRIUM SIZE: 3.8 CM
LEFT ATRIUM VOLUME (MOD BIPLANE): 41 ML
LEFT ATRIUM VOLUME INDEX (MOD BIPLANE): 27 ML/M2
SL CV AV DECELERATION TIME RETROGRADE: 1177 MS
SL CV AV PEAK GRADIENT RETROGRADE: 65 MMHG
SL CV LEFT ATRIUM LENGTH A2C: 5 CM
TR MAX PG: 33 MMHG
TR PEAK VELOCITY: 2.9 M/S
TRICUSPID ANNULAR PLANE SYSTOLIC EXCURSION: 1.4 CM
TRICUSPID VALVE PEAK REGURGITATION VELOCITY: 2.86 M/S

## 2024-09-17 PROCEDURE — 93306 TTE W/DOPPLER COMPLETE: CPT

## 2024-09-17 PROCEDURE — 93306 TTE W/DOPPLER COMPLETE: CPT | Performed by: INTERNAL MEDICINE

## 2024-09-26 ENCOUNTER — OFFICE VISIT (OUTPATIENT)
Dept: FAMILY MEDICINE CLINIC | Facility: CLINIC | Age: 84
End: 2024-09-26
Payer: MEDICARE

## 2024-09-26 VITALS
DIASTOLIC BLOOD PRESSURE: 80 MMHG | TEMPERATURE: 97.6 F | SYSTOLIC BLOOD PRESSURE: 122 MMHG | BODY MASS INDEX: 23 KG/M2 | OXYGEN SATURATION: 95 % | HEART RATE: 110 BPM | HEIGHT: 61 IN | WEIGHT: 121.8 LBS

## 2024-09-26 DIAGNOSIS — U07.1 PNEUMONIA DUE TO COVID-19 VIRUS: Primary | ICD-10-CM

## 2024-09-26 DIAGNOSIS — J12.82 PNEUMONIA DUE TO COVID-19 VIRUS: Primary | ICD-10-CM

## 2024-09-26 PROCEDURE — G2211 COMPLEX E/M VISIT ADD ON: HCPCS

## 2024-09-26 PROCEDURE — 99213 OFFICE O/P EST LOW 20 MIN: CPT

## 2024-09-26 NOTE — ASSESSMENT & PLAN NOTE
Patient presents today for ED follow-up after COVID-pneumonia was diagnosed.  She had a bad reaction to molnupiravir and is upset that it was prescribed.  I discussed with her that this was the safest antiviral for her available due to her kidney function and chronic conditions, however there are instances where patients do have side effects to this medication.  Patient discontinued antiviral after the ER visit and her symptoms resolved with supportive care.  Patient has no other acute concerns today.  Her pulmonary exam is within normal limits.  She is having a repeat PET scan in October so there is no need for a repeat chest x-ray ensure resolution of pneumonia.  She will follow-up with us as needed, or as regularly scheduled every 6 months with her PCP.

## 2024-09-26 NOTE — PROGRESS NOTES
"Ambulatory Visit  Name: Janice Ayala      : 1940      MRN: [id number]  Encounter Provider: Magdalene Funez PA-C  Encounter Date: 2024   Encounter department: [facility]    Assessment & Plan  Pneumonia due to COVID-19 virus  Patient presents today for ED follow-up after COVID-pneumonia was diagnosed.  She had a bad reaction to molnupiravir and is upset that it was prescribed.  I discussed with her that this was the safest antiviral for her available due to her kidney function and chronic conditions, however there are instances where patients do have side effects to this medication.  Patient discontinued antiviral after the ER visit and her symptoms resolved with supportive care.  Patient has no other acute concerns today.  Her pulmonary exam is within normal limits.  She is having a repeat PET scan in October so there is no need for a repeat chest x-ray ensure resolution of pneumonia.  She will follow-up with us as needed, or as regularly scheduled every 6 months with her PCP.          History of Present Illness     Patient presents today for an ED follow up.  Patient was seen on  in the ED due to COVID.  ED note as follows:     \"Patient with what looks like she has COVID-pneumonia.  She is not hypoxic and her ambulatory pulse ox was normal.  She is little tachycardic which I suspect could be due to a little dehydration and COVID.  Her kidney function is up a little bit from her prior.  Unclear if this could also be medication reaction to the new medication so after discussion we elected to stop the medication.  Will need to hold off on Paxlovid as her GFR is less than 30.  Her cardiac tests were negative x 2 with no acute ischemic changes on the EKG.  Did call over to radiology to have them read the x-ray but unfortunately they have been unable to read it at this point.  I suspect that she has pneumonia on the right side I still see the scarring on the left.  The patient " "has an active MyChart account and if there is any discrepancies they can certainly see that on the account and follow-up with their physician.  Leave that this is all due to COVID-pneumonia does not require any antibiotics at this point.  He appears quite well and actually feels well.  Does not require inpatient hospitalization.\"    Per patient and chart review, patient tested positive for COVID on 9/11.  She called in to ask for antiviral treatment due to her medical conditions and was eventually sent molnupiravir by her PCP.  After taking the first pill, she felt like her \"insides were burning up\" and was recommended to go to the ER.  While in the ER they decided to discontinue the antiviral due to the side effect.  Patient was having mild symptoms at the time and had an overall stable pulmonary exam.  There was a pneumonia found in the right upper lobe on chest x-ray however patient has a longstanding history of lung cancer with complications.  As patient was diagnosed with COVID, she was not given any antibiotics at the ER for the pneumonia.    Patient presents today and has no acute concerns.  She states her symptoms have resolved and she is feeling much better.  She is however upset that the antiviral was sent in for her under \"emergency use\" and she was not made aware of this so she is disappointed that she did not receive the right treatment course.        Pneumonia.  Review of Systems   Constitutional:  Negative for chills, fatigue and fever.   Respiratory:  Negative for cough, chest tightness and shortness of breath.    Cardiovascular:  Negative for chest pain, palpitations and leg swelling.   Neurological:  Negative for dizziness, light-headedness and headaches.           Objective     /80 (BP Location: Left arm, Patient Position: Sitting, Cuff Size: Standard)   Pulse (!) 110   Temp 97.6 °F (36.4 °C) (Temporal)   Ht 5' 1\" (1.549 m)   Wt 55.2 kg (121 lb 12.8 oz)   SpO2 95%   BMI 23.01 kg/m² "     Physical Exam  Vitals and nursing note reviewed.   Constitutional:       General: She is not in acute distress.     Appearance: Normal appearance. She is well-developed. She is not ill-appearing.   HENT:      Head: Normocephalic and atraumatic.   Neck:      Vascular: No carotid bruit.   Cardiovascular:      Rate and Rhythm: Normal rate and regular rhythm.      Pulses: Normal pulses.      Heart sounds: No murmur heard.  Pulmonary:      Effort: Pulmonary effort is normal. No respiratory distress.      Breath sounds: Normal breath sounds.   Abdominal:      General: Bowel sounds are normal. There is no distension.      Palpations: Abdomen is soft.      Tenderness: There is no abdominal tenderness.   Musculoskeletal:         General: No swelling.      Cervical back: Normal range of motion.      Right lower leg: No edema.      Left lower leg: No edema.   Lymphadenopathy:      Cervical: No cervical adenopathy.   Skin:     General: Skin is warm and dry.   Neurological:      General: No focal deficit present.      Mental Status: She is alert and oriented to person, place, and time. Mental status is at baseline.   Psychiatric:         Mood and Affect: Mood normal.         Behavior: Behavior normal.         Cognition and Memory: Cognition normal.         Judgment: Judgment normal.

## 2024-09-28 DIAGNOSIS — E83.42 HYPOMAGNESEMIA: ICD-10-CM

## 2024-09-30 RX ORDER — UREA 10 %
500 LOTION (ML) TOPICAL 2 TIMES DAILY
Qty: 10 TABLET | Refills: 0 | Status: SHIPPED | OUTPATIENT
Start: 2024-09-30 | End: 2024-10-05

## 2024-10-03 ENCOUNTER — PATIENT MESSAGE (OUTPATIENT)
Dept: FAMILY MEDICINE CLINIC | Facility: CLINIC | Age: 84
End: 2024-10-03

## 2024-10-03 DIAGNOSIS — E83.42 HYPOMAGNESEMIA: Primary | ICD-10-CM

## 2024-10-03 NOTE — PATIENT COMMUNICATION
Pt informed per Magdalene to finish out the 5 day coarse. After that in 5 days to go for recheck of Magnesium and based on those results she'll be notified if she has to continue or not. Pt understood and had no further questions.

## 2024-10-21 ENCOUNTER — TELEPHONE (OUTPATIENT)
Dept: CARDIOLOGY CLINIC | Facility: CLINIC | Age: 84
End: 2024-10-21

## 2024-10-26 PROBLEM — J12.82 PNEUMONIA DUE TO COVID-19 VIRUS: Status: RESOLVED | Noted: 2024-09-26 | Resolved: 2024-10-26

## 2024-10-26 PROBLEM — U07.1 PNEUMONIA DUE TO COVID-19 VIRUS: Status: RESOLVED | Noted: 2024-09-26 | Resolved: 2024-10-26

## 2024-11-04 DIAGNOSIS — I48.0 PAROXYSMAL ATRIAL FIBRILLATION (HCC): ICD-10-CM

## 2024-11-04 DIAGNOSIS — E03.9 ACQUIRED HYPOTHYROIDISM: ICD-10-CM

## 2024-11-04 DIAGNOSIS — E78.5 HYPERLIPIDEMIA, UNSPECIFIED HYPERLIPIDEMIA TYPE: ICD-10-CM

## 2024-11-04 DIAGNOSIS — R09.81 CHRONIC NASAL CONGESTION: ICD-10-CM

## 2024-11-04 DIAGNOSIS — I10 PRIMARY HYPERTENSION: ICD-10-CM

## 2024-11-05 RX ORDER — ROSUVASTATIN CALCIUM 10 MG/1
10 TABLET, COATED ORAL EVERY EVENING
Qty: 90 TABLET | Refills: 1 | Status: SHIPPED | OUTPATIENT
Start: 2024-11-05 | End: 2025-05-04

## 2024-11-05 RX ORDER — IPRATROPIUM BROMIDE 42 UG/1
2 SPRAY, METERED NASAL 2 TIMES DAILY
Qty: 15 ML | Refills: 0 | Status: SHIPPED | OUTPATIENT
Start: 2024-11-05

## 2024-11-05 RX ORDER — LEVOTHYROXINE SODIUM 88 UG/1
88 TABLET ORAL DAILY
Qty: 90 TABLET | Refills: 1 | Status: SHIPPED | OUTPATIENT
Start: 2024-11-05

## 2024-11-05 RX ORDER — OLMESARTAN MEDOXOMIL 20 MG/1
20 TABLET ORAL DAILY
Qty: 90 TABLET | Refills: 1 | Status: SHIPPED | OUTPATIENT
Start: 2024-11-05 | End: 2025-05-04

## 2024-11-17 DIAGNOSIS — I48.0 PAROXYSMAL ATRIAL FIBRILLATION (HCC): ICD-10-CM

## 2024-11-18 ENCOUNTER — APPOINTMENT (OUTPATIENT)
Dept: LAB | Facility: CLINIC | Age: 84
End: 2024-11-18
Payer: MEDICARE

## 2024-11-18 DIAGNOSIS — C34.90 NON-SMALL CELL LUNG CANCER, UNSPECIFIED LATERALITY (HCC): ICD-10-CM

## 2024-11-18 DIAGNOSIS — I48.0 PAROXYSMAL ATRIAL FIBRILLATION (HCC): ICD-10-CM

## 2024-11-18 DIAGNOSIS — R94.6 NONSPECIFIC ABNORMAL RESULTS OF THYROID FUNCTION STUDY: ICD-10-CM

## 2024-11-18 LAB
ALBUMIN SERPL BCG-MCNC: 3.8 G/DL (ref 3.5–5)
ALP SERPL-CCNC: 63 U/L (ref 34–104)
ALT SERPL W P-5'-P-CCNC: 14 U/L (ref 7–52)
ANION GAP SERPL CALCULATED.3IONS-SCNC: 8 MMOL/L (ref 4–13)
AST SERPL W P-5'-P-CCNC: 21 U/L (ref 13–39)
BASOPHILS # BLD AUTO: 0.03 THOUSANDS/ÂΜL (ref 0–0.1)
BASOPHILS NFR BLD AUTO: 0 % (ref 0–1)
BILIRUB SERPL-MCNC: 0.47 MG/DL (ref 0.2–1)
BUN SERPL-MCNC: 23 MG/DL (ref 5–25)
CALCIUM SERPL-MCNC: 9.1 MG/DL (ref 8.4–10.2)
CHLORIDE SERPL-SCNC: 102 MMOL/L (ref 96–108)
CO2 SERPL-SCNC: 29 MMOL/L (ref 21–32)
CREAT SERPL-MCNC: 1.43 MG/DL (ref 0.6–1.3)
EOSINOPHIL # BLD AUTO: 0.09 THOUSAND/ÂΜL (ref 0–0.61)
EOSINOPHIL NFR BLD AUTO: 1 % (ref 0–6)
ERYTHROCYTE [DISTWIDTH] IN BLOOD BY AUTOMATED COUNT: 13.4 % (ref 11.6–15.1)
FERRITIN SERPL-MCNC: 338 NG/ML (ref 11–307)
GFR SERPL CREATININE-BSD FRML MDRD: 33 ML/MIN/1.73SQ M
GLUCOSE P FAST SERPL-MCNC: 92 MG/DL (ref 65–99)
HCT VFR BLD AUTO: 37.4 % (ref 34.8–46.1)
HGB BLD-MCNC: 11.9 G/DL (ref 11.5–15.4)
IMM GRANULOCYTES # BLD AUTO: 0.03 THOUSAND/UL (ref 0–0.2)
IMM GRANULOCYTES NFR BLD AUTO: 0 % (ref 0–2)
IRON SATN MFR SERPL: 36 % (ref 15–50)
IRON SERPL-MCNC: 81 UG/DL (ref 50–212)
LYMPHOCYTES # BLD AUTO: 2.59 THOUSANDS/ÂΜL (ref 0.6–4.47)
LYMPHOCYTES NFR BLD AUTO: 34 % (ref 14–44)
MCH RBC QN AUTO: 32.6 PG (ref 26.8–34.3)
MCHC RBC AUTO-ENTMCNC: 31.8 G/DL (ref 31.4–37.4)
MCV RBC AUTO: 103 FL (ref 82–98)
MONOCYTES # BLD AUTO: 0.89 THOUSAND/ÂΜL (ref 0.17–1.22)
MONOCYTES NFR BLD AUTO: 12 % (ref 4–12)
NEUTROPHILS # BLD AUTO: 3.97 THOUSANDS/ÂΜL (ref 1.85–7.62)
NEUTS SEG NFR BLD AUTO: 53 % (ref 43–75)
NRBC BLD AUTO-RTO: 0 /100 WBCS
PLATELET # BLD AUTO: 253 THOUSANDS/UL (ref 149–390)
PMV BLD AUTO: 10.9 FL (ref 8.9–12.7)
POTASSIUM SERPL-SCNC: 4.5 MMOL/L (ref 3.5–5.3)
PROT SERPL-MCNC: 6.4 G/DL (ref 6.4–8.4)
RBC # BLD AUTO: 3.65 MILLION/UL (ref 3.81–5.12)
SODIUM SERPL-SCNC: 139 MMOL/L (ref 135–147)
T4 FREE SERPL-MCNC: 2.44 NG/DL (ref 0.61–1.12)
TIBC SERPL-MCNC: 225 UG/DL (ref 250–450)
TSH SERPL DL<=0.05 MIU/L-ACNC: 0.03 UIU/ML (ref 0.45–4.5)
UIBC SERPL-MCNC: 144 UG/DL (ref 155–355)
WBC # BLD AUTO: 7.6 THOUSAND/UL (ref 4.31–10.16)

## 2024-11-18 PROCEDURE — 36415 COLL VENOUS BLD VENIPUNCTURE: CPT

## 2024-11-18 PROCEDURE — 83550 IRON BINDING TEST: CPT

## 2024-11-18 PROCEDURE — 83540 ASSAY OF IRON: CPT

## 2024-11-18 PROCEDURE — 80053 COMPREHEN METABOLIC PANEL: CPT

## 2024-11-18 PROCEDURE — 84439 ASSAY OF FREE THYROXINE: CPT

## 2024-11-18 PROCEDURE — 82728 ASSAY OF FERRITIN: CPT

## 2024-11-18 PROCEDURE — 84443 ASSAY THYROID STIM HORMONE: CPT

## 2024-11-18 PROCEDURE — 85025 COMPLETE CBC W/AUTO DIFF WBC: CPT

## 2024-11-19 RX ORDER — AMIODARONE HYDROCHLORIDE 100 MG/1
100 TABLET ORAL DAILY
Qty: 90 TABLET | Refills: 0 | Status: SHIPPED | OUTPATIENT
Start: 2024-11-19 | End: 2025-02-17

## 2024-12-09 ENCOUNTER — TELEPHONE (OUTPATIENT)
Age: 84
End: 2024-12-09

## 2024-12-09 DIAGNOSIS — E03.9 ACQUIRED HYPOTHYROIDISM: ICD-10-CM

## 2024-12-09 RX ORDER — LEVOTHYROXINE SODIUM 75 UG/1
75 TABLET ORAL DAILY
Qty: 30 TABLET | Refills: 0 | Status: SHIPPED | OUTPATIENT
Start: 2024-12-09

## 2024-12-09 NOTE — TELEPHONE ENCOUNTER
Patient called stating that she reviewed her lab results from 11/18 and her Free T4 is 2.44 and her TSH is 0.027.  Patient would like to know if Dr. Pruett would like to adjust her thyroid medication?Please follow up with patient to advise.

## 2024-12-12 DIAGNOSIS — E03.9 ACQUIRED HYPOTHYROIDISM: Primary | ICD-10-CM

## 2024-12-12 NOTE — TELEPHONE ENCOUNTER
Pt states her Oncologist suggest she sees a specialist regarding her Thyroid. Can this referral be added for pt.    Please advise

## 2024-12-12 NOTE — TELEPHONE ENCOUNTER
Called and spoke with pt to let her know a referral was placed for endocrinology. Provided pt with contact number for endocrinology.

## 2024-12-13 ENCOUNTER — TELEPHONE (OUTPATIENT)
Age: 84
End: 2024-12-13

## 2024-12-13 NOTE — TELEPHONE ENCOUNTER
Patient called in regards to wanting provider to recommend her to someone specific for Endocrinology since she is not from around this area. Patient would like a call back with the information.

## 2024-12-29 DIAGNOSIS — J45.909 UNCOMPLICATED ASTHMA, UNSPECIFIED ASTHMA SEVERITY, UNSPECIFIED WHETHER PERSISTENT: ICD-10-CM

## 2024-12-29 DIAGNOSIS — R09.81 CHRONIC NASAL CONGESTION: ICD-10-CM

## 2024-12-31 RX ORDER — IPRATROPIUM BROMIDE 42 UG/1
2 SPRAY, METERED NASAL 2 TIMES DAILY
Qty: 15 ML | Refills: 0 | Status: SHIPPED | OUTPATIENT
Start: 2024-12-31

## 2025-01-13 ENCOUNTER — OFFICE VISIT (OUTPATIENT)
Dept: CARDIOLOGY CLINIC | Facility: CLINIC | Age: 85
End: 2025-01-13
Payer: MEDICARE

## 2025-01-13 VITALS
SYSTOLIC BLOOD PRESSURE: 126 MMHG | HEIGHT: 61 IN | BODY MASS INDEX: 21.71 KG/M2 | DIASTOLIC BLOOD PRESSURE: 80 MMHG | HEART RATE: 89 BPM | WEIGHT: 115 LBS

## 2025-01-13 DIAGNOSIS — I48.0 PAROXYSMAL ATRIAL FIBRILLATION (HCC): Primary | ICD-10-CM

## 2025-01-13 PROCEDURE — 93000 ELECTROCARDIOGRAM COMPLETE: CPT | Performed by: INTERNAL MEDICINE

## 2025-01-13 PROCEDURE — 99214 OFFICE O/P EST MOD 30 MIN: CPT | Performed by: INTERNAL MEDICINE

## 2025-01-13 RX ORDER — LEVOTHYROXINE SODIUM 50 UG/1
50 TABLET ORAL DAILY
COMMUNITY

## 2025-01-13 NOTE — PROGRESS NOTES
"      Cardiology             Janice Ayala  1940  [id number]              Assessment/Plan:    Paroxysmal atrial fibrillation, on amiodarone suppression, apixaban anticoagulation  History of CVA  Hypertension  Dyslipidemia  Non-small cell lung cancer of left upper lung 10/14/2020  Papillary thyroid carcinoma 11/2020  Carotid artery disease status post left CEA  CKD        Continue Eliquis anticoagulation at 2.5 mg twice daily.  Prior creatinine 1.43 6/2023, with body weight 52 kg.  Regular rhythm/sinus rhythm on ECG, QTc interval stable at 462 ms.  Patient not taking aspirin  Blood pressure controlled, continue olmesartan  Continue rosuvastatin for dyslipidemia.  Will defer routine lipid panel to PCP with other health maintenance blood work.  Patient currently refusing further surveillance of carotid artery disease  Levothyroxine dose recently decreased due to elevated free T4.  Follow with endocrinology        Follow-up in 1 year        Interval History:     This is an 84-year-old female with paroxysmal atrial fibrillation and CVA who has followed Dr. North at [facility].  She has been placed on amiodarone for rhythm control and has been maintained on apixaban 2.5 mg twice daily.  She was last seen by him 7/14/2022 at which time she was doing well.    She presents today for follow-up with no complaints.  From a cardiac standpoint she feels well without chest pain, shortness of breath, dizziness, palpitations, lower extremity edema.                Vitals:  Vitals:    01/13/25 1248   BP: 126/80   BP Location: Right arm   Patient Position: Sitting   Cuff Size: Adult   Pulse: 89   Weight: 52.2 kg (115 lb)   Height: 5' 1\" (1.549 m)         Past Medical History:   Diagnosis Date   • Afib (HCC)    • Chronic kidney disease    • Essential hypertension    • Hyperlipidemia    • Osteoporosis    • Thyroid disease      Social History     Socioeconomic History   • Marital status:      Spouse name: Not on file   • " Number of children: Not on file   • Years of education: Not on file   • Highest education level: Not on file   Occupational History   • Not on file   Tobacco Use   • Smoking status: Former     Current packs/day: 0.00     Average packs/day: 0.5 packs/day for 6.0 years (3.0 ttl pk-yrs)     Types: Cigarettes     Start date:      Quit date: 1965     Years since quittin.0   • Smokeless tobacco: Never   Vaping Use   • Vaping status: Never Used   Substance and Sexual Activity   • Alcohol use: Yes     Comment: rarely   • Drug use: Never   • Sexual activity: Not on file   Other Topics Concern   • Not on file   Social History Narrative   • Not on file     Social Drivers of Health     Financial Resource Strain: Low Risk  (3/6/2024)    Overall Financial Resource Strain (CARDIA)    • Difficulty of Paying Living Expenses: Not hard at all   Food Insecurity: No Food Insecurity (3/29/2022)    Received from [facility]    Hunger Vital Sign    • Worried About Running Out of Food in the Last Year: Never true    • Ran Out of Food in the Last Year: Never true   Transportation Needs: No Transportation Needs (3/6/2024)    PRAPARE - Transportation    • Lack of Transportation (Medical): No    • Lack of Transportation (Non-Medical): No   Physical Activity: Not on file   Stress: No Stress Concern Present (2022)    Received from [facility]    [employer] [place] [employer] - Occupational Stress Questionnaire    • Feeling of Stress : Not at all   Social Connections: Not on file   Intimate Partner Violence: Not At Risk (2022)    Received from [facility]    Intimate Partner Violence    • Within the last year, have you been afraid of your partner, ex-partner or family member?: 2    • Within the last year, have you been humiliated or emotionally abused in other ways by your partner, ex-partner or family  member?: 2    • Within the last year, have you been kicked, hit, slapped, or otherwise physically hurt by your partner, ex-partner or family member?: 2    • Within the last year, have you been raped or forced to have any kind of sexual activity by your partner, ex-partner or family member?: 2   Housing Stability: Unknown (3/29/2022)    [facility]    Housing Stability Vital Sign    • Unable to Pay for Housing in the Last Year: No    • Number of Places Lived in the Last Year: Not on file    • Unstable Housing in the Last Year: No      Family History   Problem Relation Age of Onset   • Heart disease Mother    • Heart disease Father    • Kidney disease Father    • Asthma Brother      Past Surgical History:   Procedure Laterality Date   • CAROTID ENDARTARECTOMY Left    • CT NEEDLE BIOPSY LUNG  10/5/2020   • HIP SURGERY Bilateral    • LUNG SURGERY Left    • US GUIDED THYROID BIOPSY  10/28/2020       Current Outpatient Medications:   •  amiodarone 100 mg tablet, Take 1 tablet (100 mg total) by mouth daily, Disp: 90 tablet, Rfl: 0  •  apixaban (Eliquis) 2.5 mg, Take 1 tablet (2.5 mg total) by mouth 2 (two) times a day, Disp: 180 tablet, Rfl: 1  •  docusate sodium (COLACE) 100 mg capsule, Take 100 mg by mouth if needed for constipation AS needed, Disp: , Rfl:   •  esomeprazole (NexIUM) 40 MG capsule, Take 1 capsule (40 mg total) by mouth every morning, Disp: 90 capsule, Rfl: 1  •  ipratropium (ATROVENT) 0.06 % nasal spray, 2 sprays into each nostril 2 (two) times a day, Disp: 15 mL, Rfl: 0  •  ipratropium-albuterol (COMBIVENT RESPIMAT) inhaler, Inhale 1 puff every 6 (six) hours as needed (wheezing), Disp: 4 g, Rfl: 3  •  levothyroxine 50 mcg tablet, Take 50 mcg by mouth daily, Disp: , Rfl:   •  magnesium gluconate (MAGONATE) 500 mg tablet, Take 1 tablet (500 mg total) by mouth 2 (two) times a day for 5 days, Disp: 10 tablet, Rfl: 0  •  olmesartan (BENICAR) 20 mg tablet,  Take 1 tablet (20 mg total) by mouth daily, Disp: 90 tablet, Rfl: 1  •  rosuvastatin (CRESTOR) 10 MG tablet, Take 1 tablet (10 mg total) by mouth every evening, Disp: 90 tablet, Rfl: 1  •  levothyroxine 75 mcg tablet, Take 1 tablet (75 mcg total) by mouth daily (Patient not taking: Reported on 1/13/2025), Disp: 30 tablet, Rfl: 0        Review of Systems:  Review of Systems   Constitutional:  Negative for activity change, fever and unexpected weight change.   HENT:  Negative for facial swelling, nosebleeds and voice change.    Respiratory:  Negative for chest tightness, shortness of breath and wheezing.    Cardiovascular:  Negative for chest pain, palpitations and leg swelling.   Gastrointestinal:  Negative for abdominal distention.   Genitourinary:  Negative for hematuria.   Musculoskeletal:  Negative for arthralgias.   Skin:  Negative for color change, pallor, rash and wound.   Neurological:  Negative for dizziness, seizures and syncope.   Psychiatric/Behavioral:  Negative for agitation.          Physical Exam:  Physical Exam  Vitals reviewed.   Constitutional:       Appearance: She is well-developed.   HENT:      Head: Normocephalic and atraumatic.   Cardiovascular:      Rate and Rhythm: Normal rate and regular rhythm.      Heart sounds: Normal heart sounds.   Pulmonary:      Effort: Pulmonary effort is normal.      Breath sounds: Normal breath sounds.   Abdominal:      Palpations: Abdomen is soft.   Musculoskeletal:         General: Normal range of motion.      Cervical back: Normal range of motion and neck supple.   Skin:     General: Skin is warm and dry.   Neurological:      Mental Status: She is alert and oriented to person, place, and time.   Psychiatric:         Behavior: Behavior normal.         Thought Content: Thought content normal.         Judgment: Judgment normal.         This note was completed in part utilizing [employer] Software.  Grammatical errors, random word insertions, spelling  mistakes, and incomplete sentences can be an occasional consequence of this system secondary to software limitations, ambient noise, and hardware issues.  If you have any questions or concerns about the content, text, or information contained within the body of this dictation, please contact the provider for clarification.

## 2025-01-23 ENCOUNTER — APPOINTMENT (OUTPATIENT)
Dept: LAB | Facility: CLINIC | Age: 85
End: 2025-01-23
Payer: MEDICARE

## 2025-01-23 DIAGNOSIS — R94.6 NONSPECIFIC ABNORMAL RESULTS OF THYROID FUNCTION STUDY: ICD-10-CM

## 2025-01-23 DIAGNOSIS — C34.90 NON-SMALL CELL LUNG CANCER, UNSPECIFIED LATERALITY (HCC): ICD-10-CM

## 2025-01-23 DIAGNOSIS — E03.9 ACQUIRED HYPOTHYROIDISM: ICD-10-CM

## 2025-01-23 LAB
ALBUMIN SERPL BCG-MCNC: 3.8 G/DL (ref 3.5–5)
ALP SERPL-CCNC: 59 U/L (ref 34–104)
ALT SERPL W P-5'-P-CCNC: 20 U/L (ref 7–52)
ANION GAP SERPL CALCULATED.3IONS-SCNC: 12 MMOL/L (ref 4–13)
AST SERPL W P-5'-P-CCNC: 33 U/L (ref 13–39)
BASOPHILS # BLD AUTO: 0.04 THOUSANDS/ΜL (ref 0–0.1)
BASOPHILS NFR BLD AUTO: 1 % (ref 0–1)
BILIRUB SERPL-MCNC: 0.41 MG/DL (ref 0.2–1)
BUN SERPL-MCNC: 26 MG/DL (ref 5–25)
CALCIUM SERPL-MCNC: 9.5 MG/DL (ref 8.4–10.2)
CHLORIDE SERPL-SCNC: 102 MMOL/L (ref 96–108)
CO2 SERPL-SCNC: 26 MMOL/L (ref 21–32)
CREAT SERPL-MCNC: 1.41 MG/DL (ref 0.6–1.3)
EOSINOPHIL # BLD AUTO: 0.09 THOUSAND/ΜL (ref 0–0.61)
EOSINOPHIL NFR BLD AUTO: 1 % (ref 0–6)
ERYTHROCYTE [DISTWIDTH] IN BLOOD BY AUTOMATED COUNT: 12.9 % (ref 11.6–15.1)
FERRITIN SERPL-MCNC: 362 NG/ML (ref 11–307)
GFR SERPL CREATININE-BSD FRML MDRD: 33 ML/MIN/1.73SQ M
GLUCOSE P FAST SERPL-MCNC: 85 MG/DL (ref 65–99)
HCT VFR BLD AUTO: 40.7 % (ref 34.8–46.1)
HGB BLD-MCNC: 12.8 G/DL (ref 11.5–15.4)
IMM GRANULOCYTES # BLD AUTO: 0.02 THOUSAND/UL (ref 0–0.2)
IMM GRANULOCYTES NFR BLD AUTO: 0 % (ref 0–2)
IRON SATN MFR SERPL: 35 % (ref 15–50)
IRON SERPL-MCNC: 80 UG/DL (ref 50–212)
LYMPHOCYTES # BLD AUTO: 2.67 THOUSANDS/ΜL (ref 0.6–4.47)
LYMPHOCYTES NFR BLD AUTO: 35 % (ref 14–44)
MCH RBC QN AUTO: 32.2 PG (ref 26.8–34.3)
MCHC RBC AUTO-ENTMCNC: 31.4 G/DL (ref 31.4–37.4)
MCV RBC AUTO: 103 FL (ref 82–98)
MONOCYTES # BLD AUTO: 0.82 THOUSAND/ΜL (ref 0.17–1.22)
MONOCYTES NFR BLD AUTO: 11 % (ref 4–12)
NEUTROPHILS # BLD AUTO: 4.07 THOUSANDS/ΜL (ref 1.85–7.62)
NEUTS SEG NFR BLD AUTO: 52 % (ref 43–75)
NRBC BLD AUTO-RTO: 0 /100 WBCS
PLATELET # BLD AUTO: 241 THOUSANDS/UL (ref 149–390)
PMV BLD AUTO: 11.1 FL (ref 8.9–12.7)
POTASSIUM SERPL-SCNC: 4.3 MMOL/L (ref 3.5–5.3)
PROT SERPL-MCNC: 6.4 G/DL (ref 6.4–8.4)
RBC # BLD AUTO: 3.97 MILLION/UL (ref 3.81–5.12)
SODIUM SERPL-SCNC: 140 MMOL/L (ref 135–147)
TIBC SERPL-MCNC: 229.6 UG/DL (ref 250–450)
TRANSFERRIN SERPL-MCNC: 164 MG/DL (ref 203–362)
TSH SERPL DL<=0.05 MIU/L-ACNC: 3.46 UIU/ML (ref 0.45–4.5)
UIBC SERPL-MCNC: 150 UG/DL (ref 155–355)
WBC # BLD AUTO: 7.71 THOUSAND/UL (ref 4.31–10.16)

## 2025-01-23 PROCEDURE — 80053 COMPREHEN METABOLIC PANEL: CPT

## 2025-01-23 PROCEDURE — 82728 ASSAY OF FERRITIN: CPT

## 2025-01-23 PROCEDURE — 83550 IRON BINDING TEST: CPT

## 2025-01-23 PROCEDURE — 85025 COMPLETE CBC W/AUTO DIFF WBC: CPT

## 2025-01-23 PROCEDURE — 84443 ASSAY THYROID STIM HORMONE: CPT

## 2025-01-23 PROCEDURE — 36415 COLL VENOUS BLD VENIPUNCTURE: CPT

## 2025-01-23 PROCEDURE — 83540 ASSAY OF IRON: CPT

## 2025-01-26 DIAGNOSIS — R09.81 CHRONIC NASAL CONGESTION: ICD-10-CM

## 2025-01-27 ENCOUNTER — OFFICE VISIT (OUTPATIENT)
Dept: FAMILY MEDICINE CLINIC | Facility: CLINIC | Age: 85
End: 2025-01-27
Payer: MEDICARE

## 2025-01-27 ENCOUNTER — TELEPHONE (OUTPATIENT)
Age: 85
End: 2025-01-27

## 2025-01-27 VITALS
DIASTOLIC BLOOD PRESSURE: 70 MMHG | OXYGEN SATURATION: 99 % | HEART RATE: 66 BPM | HEIGHT: 61 IN | TEMPERATURE: 97.6 F | SYSTOLIC BLOOD PRESSURE: 120 MMHG | BODY MASS INDEX: 22.92 KG/M2 | WEIGHT: 121.4 LBS

## 2025-01-27 DIAGNOSIS — I73.9 PERIPHERAL VASCULAR DISEASE (HCC): ICD-10-CM

## 2025-01-27 DIAGNOSIS — I10 PRIMARY HYPERTENSION: ICD-10-CM

## 2025-01-27 DIAGNOSIS — I48.0 PAROXYSMAL ATRIAL FIBRILLATION (HCC): Primary | ICD-10-CM

## 2025-01-27 DIAGNOSIS — L98.9 FACIAL SKIN LESION: ICD-10-CM

## 2025-01-27 DIAGNOSIS — C34.92 NON-SMALL CELL CARCINOMA OF LEFT LUNG (HCC): ICD-10-CM

## 2025-01-27 DIAGNOSIS — E78.5 HYPERLIPIDEMIA, UNSPECIFIED HYPERLIPIDEMIA TYPE: ICD-10-CM

## 2025-01-27 DIAGNOSIS — D61.818 PANCYTOPENIA (HCC): ICD-10-CM

## 2025-01-27 DIAGNOSIS — I46.9 CARDIAC ARREST, CAUSE UNSPECIFIED (HCC): ICD-10-CM

## 2025-01-27 DIAGNOSIS — N17.0 ACUTE RENAL FAILURE WITH ACUTE TUBULAR NECROSIS SUPERIMPOSED ON STAGE 3B CHRONIC KIDNEY DISEASE (HCC): ICD-10-CM

## 2025-01-27 DIAGNOSIS — I27.20 PULMONARY HYPERTENSION (HCC): ICD-10-CM

## 2025-01-27 DIAGNOSIS — E03.9 ACQUIRED HYPOTHYROIDISM: ICD-10-CM

## 2025-01-27 DIAGNOSIS — N18.32 ACUTE RENAL FAILURE WITH ACUTE TUBULAR NECROSIS SUPERIMPOSED ON STAGE 3B CHRONIC KIDNEY DISEASE (HCC): ICD-10-CM

## 2025-01-27 DIAGNOSIS — K21.9 GASTROESOPHAGEAL REFLUX DISEASE WITHOUT ESOPHAGITIS: ICD-10-CM

## 2025-01-27 PROCEDURE — 99215 OFFICE O/P EST HI 40 MIN: CPT | Performed by: FAMILY MEDICINE

## 2025-01-27 PROCEDURE — G2211 COMPLEX E/M VISIT ADD ON: HCPCS | Performed by: FAMILY MEDICINE

## 2025-01-27 RX ORDER — LEVOTHYROXINE SODIUM 50 UG/1
50 TABLET ORAL DAILY
Qty: 90 TABLET | Refills: 1 | Status: SHIPPED | OUTPATIENT
Start: 2025-01-27 | End: 2025-04-27

## 2025-01-27 RX ORDER — IPRATROPIUM BROMIDE 42 UG/1
2 SPRAY, METERED NASAL 2 TIMES DAILY
Qty: 15 ML | Refills: 0 | Status: SHIPPED | OUTPATIENT
Start: 2025-01-27

## 2025-01-27 RX ORDER — ROSUVASTATIN CALCIUM 10 MG/1
10 TABLET, COATED ORAL EVERY EVENING
Qty: 90 TABLET | Refills: 1 | Status: SHIPPED | OUTPATIENT
Start: 2025-01-27 | End: 2025-07-26

## 2025-01-27 NOTE — TELEPHONE ENCOUNTER
Patient called in regards to being seen today and provider informed her that he would like her to see a dermatologist. Patient stated that provider wanted to know the days that she is available to schedule her for an appointment, patient stated that she is available Monday and Wednesdays at 10 am.

## 2025-01-27 NOTE — ASSESSMENT & PLAN NOTE
Patient's thyroid function testing has been stable.  At this time, continue with her current dose of levothyroxine at 50 mcg daily.  Orders:    levothyroxine 50 mcg tablet; Take 1 tablet (50 mcg total) by mouth daily    TSH, 3rd generation with Free T4 reflex; Future

## 2025-01-27 NOTE — PROGRESS NOTES
Name: Janice Ayala      : 1940      MRN: [id number]  Encounter Provider: Carrillo Pruett DO  Encounter Date: 2025   Encounter department: [facility]  :  Assessment & Plan  Paroxysmal atrial fibrillation (HCC)  Clinically stable.  Patient has been following with cardiology regularly.  She denies any chest pain or palpitations.  Continue at this time with her current treatment of amiodarone, olmesartan as well as her Eliquis.  Orders:    TSH, 3rd generation with Free T4 reflex; Future    Primary hypertension  Blood pressure appears very well-controlled.  Continue with routine home monitoring as well as her current treatment with olmesartan as well as her amiodarone       Hyperlipidemia, unspecified hyperlipidemia type  Stable.  Continue with dietary trigger avoidance as well as her current treatment with rosuvastatin.  Orders:    Comprehensive metabolic panel; Future    Lipid Panel with Direct LDL reflex; Future    rosuvastatin (CRESTOR) 10 MG tablet; Take 1 tablet (10 mg total) by mouth every evening    Gastroesophageal reflux disease without esophagitis  Stable.  Continue with dietary trigger avoidance as well as her current treatment with Nexium.       Acquired hypothyroidism  Patient's thyroid function testing has been stable.  At this time, continue with her current dose of levothyroxine at 50 mcg daily.  Orders:    levothyroxine 50 mcg tablet; Take 1 tablet (50 mcg total) by mouth daily    TSH, 3rd generation with Free T4 reflex; Future    Non-small cell carcinoma of left lung (HCC)  Patient following with oncology as well as pulmonology regularly.  Continue with her current treatment.  Orders:    CBC; Future    Facial skin lesion  Patient has a recurrent skin lesion over her nose.  At this time, she states that she never had a biopsy of this area.  There is concern for possible malignancy.  Patient was advised that she would highly benefit from seeing a dermatologist.  She  would like to discuss this first with her daughter-in-law.  Follow-up with patient in 6 months or as needed.       Pancytopenia (HCC)         Cardiac arrest, cause unspecified (HCC)         Pulmonary hypertension (HCC)         Peripheral vascular disease (HCC)         Acute renal failure with acute tubular necrosis superimposed on stage 3b chronic kidney disease (HCC)  Lab Results   Component Value Date    EGFR 33 01/23/2025    EGFR 33 11/18/2024    EGFR 25 09/14/2024    CREATININE 1.41 (H) 01/23/2025    CREATININE 1.43 (H) 11/18/2024    CREATININE 1.76 (H) 09/14/2024                   History of Present Illness   HPI  Patient is a 84-year-old female presents today for a follow-up on her chronic conditions.  She has paroxysmal A-fib, hypertension, dyslipidemia, GERD, non-small cell carcinoma of her left lung.  She has been taking her medications regularly.  She denies adverse reactions with her medications.  She was recently started on levothyroxine by her oncologist.  She is scheduled next weeks to see endocrinology.  She has been tolerating her this medication very well.  Review of Systems   Constitutional:  Negative for activity change, chills, fatigue and fever.   HENT:  Negative for congestion, ear pain, sinus pressure and sore throat.    Eyes:  Negative for redness, itching and visual disturbance.   Respiratory:  Negative for cough and shortness of breath.    Cardiovascular:  Negative for chest pain and palpitations.   Gastrointestinal:  Negative for abdominal pain, diarrhea and nausea.   Endocrine: Negative for cold intolerance and heat intolerance.   Genitourinary:  Negative for dysuria, flank pain and frequency.   Musculoskeletal:  Negative for arthralgias, back pain, gait problem and myalgias.   Skin:  Negative for color change.   Allergic/Immunologic: Negative for environmental allergies.   Neurological:  Negative for dizziness, numbness and headaches.   Psychiatric/Behavioral:  Negative for behavioral  "problems and sleep disturbance.        Objective   /70 (BP Location: Left arm, Patient Position: Sitting, Cuff Size: Adult)   Pulse 66   Temp 97.6 °F (36.4 °C) (Temporal)   Ht 5' 1\" (1.549 m)   Wt 55.1 kg (121 lb 6.4 oz)   SpO2 99%   BMI 22.94 kg/m²      Physical Exam  Vitals reviewed.   Constitutional:       General: She is not in acute distress.     Appearance: Normal appearance. She is well-developed.   HENT:      Head: Normocephalic and atraumatic.        Right Ear: Tympanic membrane, ear canal and external ear normal. There is no impacted cerumen.      Left Ear: Tympanic membrane, ear canal and external ear normal. There is no impacted cerumen.      Nose: Nose normal. No congestion or rhinorrhea.      Mouth/Throat:      Mouth: Mucous membranes are moist.      Pharynx: No oropharyngeal exudate or posterior oropharyngeal erythema.   Eyes:      General: No scleral icterus.        Right eye: No discharge.         Left eye: No discharge.      Extraocular Movements: Extraocular movements intact.      Conjunctiva/sclera: Conjunctivae normal.      Pupils: Pupils are equal, round, and reactive to light.   Neck:      Trachea: No tracheal deviation.   Cardiovascular:      Rate and Rhythm: Normal rate and regular rhythm.      Pulses: Normal pulses.           Dorsalis pedis pulses are 2+ on the right side and 2+ on the left side.        Posterior tibial pulses are 2+ on the right side and 2+ on the left side.      Heart sounds: Normal heart sounds. No murmur heard.     No friction rub. No gallop.   Pulmonary:      Effort: Pulmonary effort is normal. No respiratory distress.      Breath sounds: Normal breath sounds. No wheezing, rhonchi or rales.   Abdominal:      General: Bowel sounds are normal. There is no distension.      Palpations: Abdomen is soft.      Tenderness: There is no abdominal tenderness. There is no guarding or rebound.   Musculoskeletal:         General: Normal range of motion.      Cervical " back: Normal range of motion and neck supple.      Right lower leg: No edema.      Left lower leg: No edema.   Lymphadenopathy:      Head:      Right side of head: No submental or submandibular adenopathy.      Left side of head: No submental or submandibular adenopathy.      Cervical: No cervical adenopathy.      Right cervical: No superficial, deep or posterior cervical adenopathy.     Left cervical: No superficial, deep or posterior cervical adenopathy.   Skin:     General: Skin is warm and dry.      Findings: No erythema.   Neurological:      General: No focal deficit present.      Mental Status: She is alert and oriented to person, place, and time.      Cranial Nerves: No cranial nerve deficit.      Sensory: Sensation is intact. No sensory deficit.      Motor: Motor function is intact.   Psychiatric:         Attention and Perception: Attention and perception normal.         Mood and Affect: Mood is not anxious or depressed.         Speech: Speech normal.         Behavior: Behavior normal.         Thought Content: Thought content normal.         Judgment: Judgment normal.

## 2025-01-27 NOTE — ASSESSMENT & PLAN NOTE
Clinically stable.  Patient has been following with cardiology regularly.  She denies any chest pain or palpitations.  Continue at this time with her current treatment of amiodarone, olmesartan as well as her Eliquis.  Orders:    TSH, 3rd generation with Free T4 reflex; Future

## 2025-01-27 NOTE — ASSESSMENT & PLAN NOTE
Lab Results   Component Value Date    EGFR 33 01/23/2025    EGFR 33 11/18/2024    EGFR 25 09/14/2024    CREATININE 1.41 (H) 01/23/2025    CREATININE 1.43 (H) 11/18/2024    CREATININE 1.76 (H) 09/14/2024

## 2025-01-27 NOTE — ASSESSMENT & PLAN NOTE
Stable.  Continue with dietary trigger avoidance as well as her current treatment with rosuvastatin.  Orders:    Comprehensive metabolic panel; Future    Lipid Panel with Direct LDL reflex; Future    rosuvastatin (CRESTOR) 10 MG tablet; Take 1 tablet (10 mg total) by mouth every evening

## 2025-03-02 DIAGNOSIS — K21.9 GASTROESOPHAGEAL REFLUX DISEASE WITHOUT ESOPHAGITIS: ICD-10-CM

## 2025-03-04 RX ORDER — ESOMEPRAZOLE MAGNESIUM 40 MG/1
40 CAPSULE, DELAYED RELEASE ORAL EVERY MORNING
Qty: 90 CAPSULE | Refills: 1 | Status: SHIPPED | OUTPATIENT
Start: 2025-03-04

## 2025-04-14 ENCOUNTER — APPOINTMENT (OUTPATIENT)
Dept: LAB | Facility: CLINIC | Age: 85
End: 2025-04-14
Payer: MEDICARE

## 2025-04-14 DIAGNOSIS — C34.90 NON-SMALL CELL LUNG CANCER, UNSPECIFIED LATERALITY (HCC): ICD-10-CM

## 2025-04-14 DIAGNOSIS — R94.6 NONSPECIFIC ABNORMAL RESULTS OF THYROID FUNCTION STUDY: ICD-10-CM

## 2025-04-14 LAB
ALBUMIN SERPL BCG-MCNC: 4.2 G/DL (ref 3.5–5)
ALP SERPL-CCNC: 61 U/L (ref 34–104)
ALT SERPL W P-5'-P-CCNC: 17 U/L (ref 7–52)
ANION GAP SERPL CALCULATED.3IONS-SCNC: 9 MMOL/L (ref 4–13)
AST SERPL W P-5'-P-CCNC: 27 U/L (ref 13–39)
BASOPHILS # BLD AUTO: 0.04 THOUSANDS/ÂΜL (ref 0–0.1)
BASOPHILS NFR BLD AUTO: 1 % (ref 0–1)
BILIRUB SERPL-MCNC: 0.55 MG/DL (ref 0.2–1)
BUN SERPL-MCNC: 26 MG/DL (ref 5–25)
CALCIUM SERPL-MCNC: 9.6 MG/DL (ref 8.4–10.2)
CHLORIDE SERPL-SCNC: 103 MMOL/L (ref 96–108)
CO2 SERPL-SCNC: 27 MMOL/L (ref 21–32)
CREAT SERPL-MCNC: 1.46 MG/DL (ref 0.6–1.3)
EOSINOPHIL # BLD AUTO: 0.11 THOUSAND/ÂΜL (ref 0–0.61)
EOSINOPHIL NFR BLD AUTO: 1 % (ref 0–6)
ERYTHROCYTE [DISTWIDTH] IN BLOOD BY AUTOMATED COUNT: 13 % (ref 11.6–15.1)
FERRITIN SERPL-MCNC: 234 NG/ML (ref 30–307)
GFR SERPL CREATININE-BSD FRML MDRD: 32 ML/MIN/1.73SQ M
GLUCOSE SERPL-MCNC: 104 MG/DL (ref 65–140)
HCT VFR BLD AUTO: 40.3 % (ref 34.8–46.1)
HGB BLD-MCNC: 13 G/DL (ref 11.5–15.4)
IMM GRANULOCYTES # BLD AUTO: 0.02 THOUSAND/UL (ref 0–0.2)
IMM GRANULOCYTES NFR BLD AUTO: 0 % (ref 0–2)
IRON SATN MFR SERPL: 38 % (ref 15–50)
IRON SERPL-MCNC: 93 UG/DL (ref 50–212)
LYMPHOCYTES # BLD AUTO: 2.67 THOUSANDS/ÂΜL (ref 0.6–4.47)
LYMPHOCYTES NFR BLD AUTO: 34 % (ref 14–44)
MCH RBC QN AUTO: 33 PG (ref 26.8–34.3)
MCHC RBC AUTO-ENTMCNC: 32.3 G/DL (ref 31.4–37.4)
MCV RBC AUTO: 102 FL (ref 82–98)
MONOCYTES # BLD AUTO: 0.78 THOUSAND/ÂΜL (ref 0.17–1.22)
MONOCYTES NFR BLD AUTO: 10 % (ref 4–12)
NEUTROPHILS # BLD AUTO: 4.34 THOUSANDS/ÂΜL (ref 1.85–7.62)
NEUTS SEG NFR BLD AUTO: 54 % (ref 43–75)
NRBC BLD AUTO-RTO: 0 /100 WBCS
PLATELET # BLD AUTO: 239 THOUSANDS/UL (ref 149–390)
PMV BLD AUTO: 10.9 FL (ref 8.9–12.7)
POTASSIUM SERPL-SCNC: 4.8 MMOL/L (ref 3.5–5.3)
PROT SERPL-MCNC: 6.8 G/DL (ref 6.4–8.4)
RBC # BLD AUTO: 3.94 MILLION/UL (ref 3.81–5.12)
SODIUM SERPL-SCNC: 139 MMOL/L (ref 135–147)
TIBC SERPL-MCNC: 245 UG/DL (ref 250–450)
TRANSFERRIN SERPL-MCNC: 175 MG/DL (ref 203–362)
TSH SERPL DL<=0.05 MIU/L-ACNC: 3.37 UIU/ML (ref 0.45–4.5)
UIBC SERPL-MCNC: 152 UG/DL (ref 155–355)
WBC # BLD AUTO: 7.96 THOUSAND/UL (ref 4.31–10.16)

## 2025-04-14 PROCEDURE — 83540 ASSAY OF IRON: CPT

## 2025-04-14 PROCEDURE — 85025 COMPLETE CBC W/AUTO DIFF WBC: CPT

## 2025-04-14 PROCEDURE — 80053 COMPREHEN METABOLIC PANEL: CPT

## 2025-04-14 PROCEDURE — 36415 COLL VENOUS BLD VENIPUNCTURE: CPT

## 2025-04-14 PROCEDURE — 84443 ASSAY THYROID STIM HORMONE: CPT

## 2025-04-14 PROCEDURE — 83550 IRON BINDING TEST: CPT

## 2025-04-14 PROCEDURE — 82728 ASSAY OF FERRITIN: CPT

## 2025-05-04 DIAGNOSIS — E03.9 ACQUIRED HYPOTHYROIDISM: ICD-10-CM

## 2025-05-04 DIAGNOSIS — I48.0 PAROXYSMAL ATRIAL FIBRILLATION (HCC): ICD-10-CM

## 2025-05-04 DIAGNOSIS — I10 PRIMARY HYPERTENSION: ICD-10-CM

## 2025-05-04 DIAGNOSIS — E78.5 HYPERLIPIDEMIA, UNSPECIFIED HYPERLIPIDEMIA TYPE: ICD-10-CM

## 2025-05-04 RX ORDER — LEVOTHYROXINE SODIUM 50 UG/1
50 TABLET ORAL DAILY
Qty: 90 TABLET | Refills: 1 | Status: SHIPPED | OUTPATIENT
Start: 2025-05-04

## 2025-05-05 DIAGNOSIS — I10 PRIMARY HYPERTENSION: ICD-10-CM

## 2025-05-05 DIAGNOSIS — I48.0 PAROXYSMAL ATRIAL FIBRILLATION (HCC): ICD-10-CM

## 2025-05-05 RX ORDER — OLMESARTAN MEDOXOMIL 20 MG/1
20 TABLET ORAL DAILY
Qty: 90 TABLET | Refills: 0 | OUTPATIENT
Start: 2025-05-05

## 2025-05-05 RX ORDER — OLMESARTAN MEDOXOMIL 20 MG/1
20 TABLET ORAL DAILY
Qty: 90 TABLET | Refills: 1 | Status: SHIPPED | OUTPATIENT
Start: 2025-05-05 | End: 2025-11-01

## 2025-05-05 RX ORDER — ROSUVASTATIN CALCIUM 10 MG/1
10 TABLET, COATED ORAL EVERY EVENING
Qty: 90 TABLET | Refills: 0 | OUTPATIENT
Start: 2025-05-05 | End: 2025-11-01

## 2025-05-06 RX ORDER — OLMESARTAN MEDOXOMIL 20 MG/1
20 TABLET ORAL DAILY
Qty: 90 TABLET | Refills: 0 | OUTPATIENT
Start: 2025-05-06 | End: 2025-11-02

## 2025-05-18 DIAGNOSIS — I48.0 PAROXYSMAL ATRIAL FIBRILLATION (HCC): ICD-10-CM

## 2025-05-19 DIAGNOSIS — I48.0 PAROXYSMAL ATRIAL FIBRILLATION (HCC): ICD-10-CM

## 2025-05-19 RX ORDER — AMIODARONE HYDROCHLORIDE 100 MG/1
100 TABLET ORAL DAILY
Qty: 90 TABLET | Refills: 0 | Status: SHIPPED | OUTPATIENT
Start: 2025-05-19 | End: 2025-08-17

## 2025-06-05 DIAGNOSIS — J45.909 UNCOMPLICATED ASTHMA, UNSPECIFIED ASTHMA SEVERITY, UNSPECIFIED WHETHER PERSISTENT: ICD-10-CM

## 2025-06-23 ENCOUNTER — NURSE TRIAGE (OUTPATIENT)
Age: 85
End: 2025-06-23

## 2025-06-23 DIAGNOSIS — N89.8 VAGINAL IRRITATION: Primary | ICD-10-CM

## 2025-06-23 RX ORDER — CLOTRIMAZOLE AND BETAMETHASONE DIPROPIONATE 10; .64 MG/G; MG/G
CREAM TOPICAL
Qty: 30 G | Refills: 0 | Status: SHIPPED | OUTPATIENT
Start: 2025-06-23

## 2025-06-23 NOTE — TELEPHONE ENCOUNTER
Regarding: possible yeast infection; itching and burning  ----- Message from Jeni TROY sent at 6/23/2025 12:25 PM EDT -----  Patient is having itching and burning in vaginal area. She said Monistat is not working. She requested a refill of clotrimazole-betamethasone cream, which is no longer active on her med list.    Call back phone #[phone]

## 2025-06-23 NOTE — TELEPHONE ENCOUNTER
"  REASON FOR CONVERSATION: Vaginal Problem    SYMPTOMS: Itching and burning, patient has tried the OTC Monistat x 3 days without any relief    OTHER HEALTH INFORMATION: no recent antibiotics    PROTOCOL DISPOSITION: See Within 3 Days in Office    CARE ADVICE PROVIDED: hygiene,     PRACTICE FOLLOW-UP: Patient is asking for a prescription of clotrimazole-betamethasone. Patient was prescribed this in February 2024 and it worked great. Patient is asking that any prescription be sent to   [id number] - [place] - [address]  [address] [address]  Phone: [phone]  Fax: [phone]  HEMANTH #: --     Please notify the patient if/when the medication is ordered. Patient can be reached at [phone]    Reason for Disposition   Vaginal itching and not improved > 3 days following Care Advice    Answer Assessment - Initial Assessment Questions  1. SYMPTOM: \"What's the main symptom you're concerned about?\" (e.g., pain, itching, dryness)      Itching and burning  2. LOCATION: \"Where is the itching located?\" (e.g., inside/outside, left/right)      external  3. ONSET: \"When did the symptoms  start?\"      4 days  4. PAIN: \"Is there any pain?\" If Yes, ask: \"How bad is it?\" (Scale: 1-10; mild, moderate, severe)      denies  5. ITCHING: \"Is there any itching?\" If Yes, ask: \"How bad is it?\" (Scale: 1-10; mild, moderate, severe)      yes  6. CAUSE: \"What do you think is causing the discharge?\" \"Have you had the same problem before?\" \"What happened then?\"      denies  7. OTHER SYMPTOMS: \"Do you have any other symptoms?\" (e.g., fever, itching, vaginal bleeding, pain with urination, injury to genital area, vaginal foreign body)      denies  8. PREGNANCY: \"Is there any chance you are pregnant?\" \"When was your last menstrual period?\"      N/A    Protocols used: Vaginal Symptoms-Adult-[place]    "

## 2025-06-23 NOTE — TELEPHONE ENCOUNTER
Pt called to let us know she is unable to come in the office for an appointment due to transportation, I checked and I don't see any available appts to schedule this week. Pt is hoping for a response to this medication today.    Please advise

## 2025-07-02 DIAGNOSIS — N89.8 VAGINAL IRRITATION: ICD-10-CM

## 2025-07-03 DIAGNOSIS — N89.8 VAGINAL IRRITATION: ICD-10-CM

## 2025-07-03 RX ORDER — CLOTRIMAZOLE AND BETAMETHASONE DIPROPIONATE 10; .64 MG/G; MG/G
CREAM TOPICAL
Qty: 45 G | Refills: 0 | Status: SHIPPED | OUTPATIENT
Start: 2025-07-03

## 2025-07-03 RX ORDER — CLOTRIMAZOLE AND BETAMETHASONE DIPROPIONATE 10; .64 MG/G; MG/G
CREAM TOPICAL
Qty: 45 G | Refills: 0 | OUTPATIENT
Start: 2025-07-03

## 2025-07-26 ENCOUNTER — APPOINTMENT (OUTPATIENT)
Dept: LAB | Facility: CLINIC | Age: 85
End: 2025-07-26
Payer: MEDICARE

## 2025-07-26 DIAGNOSIS — E03.9 ACQUIRED HYPOTHYROIDISM: ICD-10-CM

## 2025-07-26 DIAGNOSIS — R94.6 NONSPECIFIC ABNORMAL RESULTS OF THYROID FUNCTION STUDY: ICD-10-CM

## 2025-07-26 DIAGNOSIS — C34.90 NON-SMALL CELL LUNG CANCER, UNSPECIFIED LATERALITY (HCC): ICD-10-CM

## 2025-07-26 DIAGNOSIS — C34.92 NON-SMALL CELL CARCINOMA OF LEFT LUNG (HCC): ICD-10-CM

## 2025-07-26 DIAGNOSIS — I48.0 PAROXYSMAL ATRIAL FIBRILLATION (HCC): ICD-10-CM

## 2025-07-26 DIAGNOSIS — E78.5 HYPERLIPIDEMIA, UNSPECIFIED HYPERLIPIDEMIA TYPE: ICD-10-CM

## 2025-07-26 LAB
ALBUMIN SERPL BCG-MCNC: 3.8 G/DL (ref 3.5–5)
ALP SERPL-CCNC: 56 U/L (ref 34–104)
ALT SERPL W P-5'-P-CCNC: 18 U/L (ref 7–52)
ANION GAP SERPL CALCULATED.3IONS-SCNC: 10 MMOL/L (ref 4–13)
AST SERPL W P-5'-P-CCNC: 28 U/L (ref 13–39)
BASOPHILS # BLD AUTO: 0.01 THOUSANDS/ÂΜL (ref 0–0.1)
BASOPHILS NFR BLD AUTO: 0 % (ref 0–1)
BILIRUB SERPL-MCNC: 0.34 MG/DL (ref 0.2–1)
BUN SERPL-MCNC: 24 MG/DL (ref 5–25)
CALCIUM SERPL-MCNC: 9.2 MG/DL (ref 8.4–10.2)
CHLORIDE SERPL-SCNC: 103 MMOL/L (ref 96–108)
CHOLEST SERPL-MCNC: 129 MG/DL (ref ?–200)
CO2 SERPL-SCNC: 27 MMOL/L (ref 21–32)
CREAT SERPL-MCNC: 1.37 MG/DL (ref 0.6–1.3)
EOSINOPHIL # BLD AUTO: 0.09 THOUSAND/ÂΜL (ref 0–0.61)
EOSINOPHIL NFR BLD AUTO: 1 % (ref 0–6)
ERYTHROCYTE [DISTWIDTH] IN BLOOD BY AUTOMATED COUNT: 12.7 % (ref 11.6–15.1)
FERRITIN SERPL-MCNC: 307 NG/ML (ref 30–307)
GFR SERPL CREATININE-BSD FRML MDRD: 34 ML/MIN/1.73SQ M
GLUCOSE P FAST SERPL-MCNC: 85 MG/DL (ref 65–99)
HCT VFR BLD AUTO: 38.5 % (ref 34.8–46.1)
HDLC SERPL-MCNC: 62 MG/DL
HGB BLD-MCNC: 12.2 G/DL (ref 11.5–15.4)
IMM GRANULOCYTES # BLD AUTO: 0.02 THOUSAND/UL (ref 0–0.2)
IMM GRANULOCYTES NFR BLD AUTO: 0 % (ref 0–2)
IRON SATN MFR SERPL: 31 % (ref 15–50)
IRON SERPL-MCNC: 72 UG/DL (ref 50–212)
LDLC SERPL CALC-MCNC: 51 MG/DL (ref 0–100)
LYMPHOCYTES # BLD AUTO: 1.77 THOUSANDS/ÂΜL (ref 0.6–4.47)
LYMPHOCYTES NFR BLD AUTO: 23 % (ref 14–44)
MCH RBC QN AUTO: 32.5 PG (ref 26.8–34.3)
MCHC RBC AUTO-ENTMCNC: 31.7 G/DL (ref 31.4–37.4)
MCV RBC AUTO: 103 FL (ref 82–98)
MONOCYTES # BLD AUTO: 0.8 THOUSAND/ÂΜL (ref 0.17–1.22)
MONOCYTES NFR BLD AUTO: 10 % (ref 4–12)
NEUTROPHILS # BLD AUTO: 5.19 THOUSANDS/ÂΜL (ref 1.85–7.62)
NEUTS SEG NFR BLD AUTO: 66 % (ref 43–75)
NRBC BLD AUTO-RTO: 0 /100 WBCS
PLATELET # BLD AUTO: 238 THOUSANDS/UL (ref 149–390)
PMV BLD AUTO: 10.3 FL (ref 8.9–12.7)
POTASSIUM SERPL-SCNC: 4.8 MMOL/L (ref 3.5–5.3)
PROT SERPL-MCNC: 6.7 G/DL (ref 6.4–8.4)
RBC # BLD AUTO: 3.75 MILLION/UL (ref 3.81–5.12)
SODIUM SERPL-SCNC: 140 MMOL/L (ref 135–147)
T4 FREE SERPL-MCNC: 1.32 NG/DL (ref 0.61–1.12)
TIBC SERPL-MCNC: 233.8 UG/DL (ref 250–450)
TRANSFERRIN SERPL-MCNC: 167 MG/DL (ref 203–362)
TRIGL SERPL-MCNC: 78 MG/DL (ref ?–150)
TSH SERPL DL<=0.05 MIU/L-ACNC: 4.92 UIU/ML (ref 0.45–4.5)
UIBC SERPL-MCNC: 162 UG/DL (ref 155–355)
WBC # BLD AUTO: 7.88 THOUSAND/UL (ref 4.31–10.16)

## 2025-07-26 PROCEDURE — 84439 ASSAY OF FREE THYROXINE: CPT

## 2025-07-26 PROCEDURE — 83550 IRON BINDING TEST: CPT

## 2025-07-26 PROCEDURE — 80061 LIPID PANEL: CPT

## 2025-07-26 PROCEDURE — 82728 ASSAY OF FERRITIN: CPT

## 2025-07-26 PROCEDURE — 84443 ASSAY THYROID STIM HORMONE: CPT

## 2025-07-26 PROCEDURE — 85025 COMPLETE CBC W/AUTO DIFF WBC: CPT

## 2025-07-26 PROCEDURE — 80053 COMPREHEN METABOLIC PANEL: CPT

## 2025-07-26 PROCEDURE — 36415 COLL VENOUS BLD VENIPUNCTURE: CPT

## 2025-07-26 PROCEDURE — 83540 ASSAY OF IRON: CPT

## 2025-07-29 DIAGNOSIS — J45.909 UNCOMPLICATED ASTHMA, UNSPECIFIED ASTHMA SEVERITY, UNSPECIFIED WHETHER PERSISTENT: ICD-10-CM

## 2025-07-30 DIAGNOSIS — N89.8 VAGINAL IRRITATION: ICD-10-CM

## 2025-07-30 RX ORDER — CLOTRIMAZOLE AND BETAMETHASONE DIPROPIONATE 10; .64 MG/G; MG/G
CREAM TOPICAL
Qty: 45 G | Refills: 0 | Status: SHIPPED | OUTPATIENT
Start: 2025-07-30

## 2025-07-31 ENCOUNTER — DOCUMENTATION (OUTPATIENT)
Dept: PULMONOLOGY | Facility: CLINIC | Age: 85
End: 2025-07-31

## 2025-07-31 DIAGNOSIS — J45.909 UNCOMPLICATED ASTHMA, UNSPECIFIED ASTHMA SEVERITY, UNSPECIFIED WHETHER PERSISTENT: ICD-10-CM

## 2025-07-31 DIAGNOSIS — N89.8 VAGINAL IRRITATION: ICD-10-CM

## 2025-07-31 RX ORDER — IPRATROPIUM BROMIDE AND ALBUTEROL 20; 100 UG/1; UG/1
SPRAY, METERED RESPIRATORY (INHALATION)
Qty: 4 G | Refills: 0 | OUTPATIENT
Start: 2025-07-31

## 2025-07-31 RX ORDER — CLOTRIMAZOLE AND BETAMETHASONE DIPROPIONATE 10; .64 MG/G; MG/G
CREAM TOPICAL
Qty: 45 G | Refills: 0 | Status: CANCELLED | OUTPATIENT
Start: 2025-07-31

## 2025-08-01 ENCOUNTER — OFFICE VISIT (OUTPATIENT)
Dept: FAMILY MEDICINE CLINIC | Facility: CLINIC | Age: 85
End: 2025-08-01
Payer: MEDICARE

## 2025-08-01 VITALS
OXYGEN SATURATION: 94 % | HEIGHT: 62 IN | SYSTOLIC BLOOD PRESSURE: 134 MMHG | HEART RATE: 106 BPM | WEIGHT: 120 LBS | TEMPERATURE: 97.8 F | DIASTOLIC BLOOD PRESSURE: 72 MMHG | BODY MASS INDEX: 22.08 KG/M2

## 2025-08-01 DIAGNOSIS — K21.9 GASTROESOPHAGEAL REFLUX DISEASE WITHOUT ESOPHAGITIS: ICD-10-CM

## 2025-08-01 DIAGNOSIS — E78.5 HYPERLIPIDEMIA, UNSPECIFIED HYPERLIPIDEMIA TYPE: ICD-10-CM

## 2025-08-01 DIAGNOSIS — Z00.00 MEDICARE ANNUAL WELLNESS VISIT, SUBSEQUENT: Primary | ICD-10-CM

## 2025-08-01 DIAGNOSIS — C34.31 MALIGNANT NEOPLASM OF LOWER LOBE OF RIGHT LUNG (HCC): ICD-10-CM

## 2025-08-01 DIAGNOSIS — I10 PRIMARY HYPERTENSION: ICD-10-CM

## 2025-08-01 DIAGNOSIS — E03.9 ACQUIRED HYPOTHYROIDISM: ICD-10-CM

## 2025-08-01 DIAGNOSIS — J45.909 UNCOMPLICATED ASTHMA, UNSPECIFIED ASTHMA SEVERITY, UNSPECIFIED WHETHER PERSISTENT: ICD-10-CM

## 2025-08-01 DIAGNOSIS — I48.0 PAROXYSMAL ATRIAL FIBRILLATION (HCC): ICD-10-CM

## 2025-08-01 PROCEDURE — G2211 COMPLEX E/M VISIT ADD ON: HCPCS | Performed by: FAMILY MEDICINE

## 2025-08-01 PROCEDURE — 99214 OFFICE O/P EST MOD 30 MIN: CPT | Performed by: FAMILY MEDICINE

## 2025-08-01 PROCEDURE — G0439 PPPS, SUBSEQ VISIT: HCPCS | Performed by: FAMILY MEDICINE

## 2025-08-01 RX ORDER — ROSUVASTATIN CALCIUM 10 MG/1
10 TABLET, COATED ORAL EVERY EVENING
Qty: 90 TABLET | Refills: 1 | Status: SHIPPED | OUTPATIENT
Start: 2025-08-01 | End: 2026-01-28

## 2025-08-01 RX ORDER — IPRATROPIUM BROMIDE AND ALBUTEROL 20; 100 UG/1; UG/1
SPRAY, METERED RESPIRATORY (INHALATION)
Qty: 4 G | Refills: 0 | OUTPATIENT
Start: 2025-08-01

## 2025-08-04 DIAGNOSIS — I48.0 PAROXYSMAL ATRIAL FIBRILLATION (HCC): ICD-10-CM

## 2025-08-06 RX ORDER — AMIODARONE HYDROCHLORIDE 100 MG/1
100 TABLET ORAL DAILY
Qty: 90 TABLET | Refills: 0 | Status: SHIPPED | OUTPATIENT
Start: 2025-08-06

## 2025-08-18 DIAGNOSIS — N89.8 VAGINAL IRRITATION: ICD-10-CM

## 2025-08-18 RX ORDER — CLOTRIMAZOLE AND BETAMETHASONE DIPROPIONATE 10; .64 MG/G; MG/G
CREAM TOPICAL
Qty: 45 G | Refills: 0 | Status: SHIPPED | OUTPATIENT
Start: 2025-08-18